# Patient Record
Sex: FEMALE | Race: ASIAN | HISPANIC OR LATINO | Employment: UNEMPLOYED | URBAN - METROPOLITAN AREA
[De-identification: names, ages, dates, MRNs, and addresses within clinical notes are randomized per-mention and may not be internally consistent; named-entity substitution may affect disease eponyms.]

---

## 2023-10-17 ENCOUNTER — OFFICE VISIT (OUTPATIENT)
Dept: INTERNAL MEDICINE | Facility: CLINIC | Age: 28
End: 2023-10-17

## 2023-10-17 VITALS
DIASTOLIC BLOOD PRESSURE: 66 MMHG | HEIGHT: 60 IN | WEIGHT: 151.19 LBS | RESPIRATION RATE: 18 BRPM | SYSTOLIC BLOOD PRESSURE: 108 MMHG | TEMPERATURE: 99 F | HEART RATE: 72 BPM | BODY MASS INDEX: 29.68 KG/M2

## 2023-10-17 DIAGNOSIS — N63.42 SUBAREOLAR MASS OF LEFT BREAST: Primary | ICD-10-CM

## 2023-10-17 DIAGNOSIS — Z00.00 WELLNESS EXAMINATION: ICD-10-CM

## 2023-10-17 DIAGNOSIS — Z12.4 CERVICAL CANCER SCREENING: ICD-10-CM

## 2023-10-17 LAB
APPEARANCE UR: CLEAR
BACTERIA #/AREA URNS AUTO: ABNORMAL /HPF
BILIRUB UR QL STRIP.AUTO: NEGATIVE
COLOR UR AUTO: ABNORMAL
GLUCOSE UR QL STRIP.AUTO: NORMAL
HYALINE CASTS #/AREA URNS LPF: ABNORMAL /LPF
KETONES UR QL STRIP.AUTO: NEGATIVE
LEUKOCYTE ESTERASE UR QL STRIP.AUTO: NEGATIVE
MUCOUS THREADS URNS QL MICRO: ABNORMAL /LPF
NITRITE UR QL STRIP.AUTO: NEGATIVE
PH UR STRIP.AUTO: 5 [PH]
PROT UR QL STRIP.AUTO: NEGATIVE
RBC #/AREA URNS AUTO: ABNORMAL /HPF
RBC UR QL AUTO: ABNORMAL
SP GR UR STRIP.AUTO: 1.02 (ref 1–1.03)
SQUAMOUS #/AREA URNS LPF: ABNORMAL /HPF
UROBILINOGEN UR STRIP-ACNC: NORMAL
WBC #/AREA URNS AUTO: ABNORMAL /HPF

## 2023-10-17 PROCEDURE — 99203 OFFICE O/P NEW LOW 30 MIN: CPT | Mod: S$PBB,,, | Performed by: NURSE PRACTITIONER

## 2023-10-17 PROCEDURE — 99203 PR OFFICE/OUTPT VISIT, NEW, LEVL III, 30-44 MIN: ICD-10-PCS | Mod: S$PBB,,, | Performed by: NURSE PRACTITIONER

## 2023-10-17 PROCEDURE — 81001 URINALYSIS AUTO W/SCOPE: CPT | Performed by: NURSE PRACTITIONER

## 2023-10-17 PROCEDURE — 99205 OFFICE O/P NEW HI 60 MIN: CPT | Mod: PBBFAC | Performed by: NURSE PRACTITIONER

## 2023-10-17 NOTE — PROGRESS NOTES
Patient ID: [id number]     Chief Complaint: Establish Care    HPI:     : Abram [id number]    Leola Joshua is a Welsh Speaking 28 y.o. female with diagnosis of no significant medical history. Patient seen in clinic today to establish care.  Patient states she does not have a PCP. Patient states she has a lump to her left breast x3 years, appears to have increased in size recently. Patient states history of breast cyst x2 with removal. Patient states tenderness to area, denies nipple discharge. Patient denies family history of breast cancer. Patient denies any other acute complaints.     Review of patient's allergies indicates:  No Known Allergies    Breast Cancer Screening: deferred due to age  Cervical Cancer Screening: referred to GYN  Colorectal Cancer Screening: deferred due to age  Diabetic Eye Exam: N/A  Diabetic Foot Exam: N/A  Lung Cancer Screening: N/A  Prostate Cancer Screening: N/A  AAA Screening: N/A  Osteoporosis Screening: deferred due to age  Medicare Wellness: N/A  Immunizations:   There is no immunization history on file for this patient.    Past Surgical History:   Procedure Laterality Date    BREAST BIOPSY Right     BREAST MASS EXCISION Left      family history includes Diabetes in her paternal grandfather; No Known Problems in her father and mother.    Social History     Socioeconomic History    Marital status: Single    Number of children: 2   Tobacco Use    Smoking status: Never    Smokeless tobacco: Never   Substance and Sexual Activity    Alcohol use: Yes     Alcohol/week: 2.0 standard drinks of alcohol     Types: 2 Glasses of wine per week     Comment: occasionally    Drug use: Never    Sexual activity: Yes     Partners: Male     No current outpatient medications    Subjective:     Review of Systems   Constitutional: Negative.    HENT: Negative.     Eyes: Negative.    Respiratory: Negative.     Cardiovascular: Negative.    Gastrointestinal: Negative.    Endocrine:  "Negative.    Genitourinary: Negative.    Musculoskeletal: Negative.    Skin: Negative.    Allergic/Immunologic: Negative.    Neurological: Negative.    Hematological: Negative.    Psychiatric/Behavioral: Negative.         Objective:     Visit Vitals  /66 (BP Location: Right arm, Patient Position: Sitting, BP Method: Medium (Automatic))   Pulse 72   Temp 98.6 °F (37 °C) (Oral)   Resp 18   Ht 5' (1.524 m)   Wt 68.6 kg (151 lb 3.2 oz)   LMP 09/20/2023 (Approximate)   BMI 29.53 kg/m²       Physical Exam  Vitals reviewed.   Constitutional:       Appearance: Normal appearance.   HENT:      Head: Normocephalic and atraumatic.      Mouth/Throat:      Mouth: Mucous membranes are moist.      Pharynx: Oropharynx is clear.   Eyes:      Extraocular Movements: Extraocular movements intact.      Conjunctiva/sclera: Conjunctivae normal.      Pupils: Pupils are equal, round, and reactive to light.   Cardiovascular:      Rate and Rhythm: Normal rate and regular rhythm.      Heart sounds: Normal heart sounds.   Pulmonary:      Effort: Pulmonary effort is normal.      Breath sounds: Normal breath sounds.   Chest:   Breasts:     Breasts are symmetrical.      Right: No swelling, bleeding, inverted nipple, nipple discharge or skin change.      Left: Mass and tenderness present. No swelling, bleeding, inverted nipple, nipple discharge or skin change.       Abdominal:      General: Bowel sounds are normal.   Musculoskeletal:         General: Normal range of motion.      Cervical back: Normal range of motion.   Skin:     General: Skin is warm and dry.   Neurological:      Mental Status: She is alert and oriented to person, place, and time.   Psychiatric:         Mood and Affect: Mood normal.         Behavior: Behavior normal.       Labs Reviewed:     Hematology:  No results found for: "WBC", "HGB", "HCT", "PLT"    Chemistry:  No results found for: "NA", "K", "CHLORIDE", "BUN", "CREATININE", "EGFRNORACEVR", "GLUCOSE", "CALCIUM", " ""ALKPHOS", "LABPROT", "ALBUMIN", "BILIDIR", "IBILI", "AST", "ALT", "MG", "PHOS", "[id number]"     No results found for: "HGBA1C", "MICROALBCREA"     Lipid Panel:  No results found for: "CHOL", "HDL", "LDL", "TRIG", "TOTALCHOLEST"     Thyroid:  No results found for: "TSH", "T4FREE", "T3FREE"     Urine:  No results found for: "COLORUA", "APPEARANCEUA", "SGUA", "PHUA", "PROTEINUA", "GLUCOSEUA", "KETONESUA", "BLOODUA", "NITRITESUA", "LEUKOCYTESUR", "RBCUA", "WBCUA", "BACTERIA", "SQEPUA", "HYALINECASTS", "CREATRANDUR", "PROTEINURINE", "UPROTCREA"     Assessment:       ICD-10-CM ICD-9-CM   1. Subareolar mass of left breast  N63.42 611.72   2. Cervical cancer screening  Z12.4 V76.2   3. Wellness examination  Z00.00 V70.0        Plan:     1. Subareolar mass of left breast  - Mammo Digital Diagnostic Left; Future  - US Breast Left Limited; Future    2. Cervical cancer screening  - Ambulatory referral/consult to Gynecology; Future    3. Wellness examination  - CBC Auto Differential; Future  - Comprehensive Metabolic Panel; Future  - Hemoglobin A1C; Future  - Lipid Panel; Future  - Urinalysis; Future  - TSH; Future  - Hepatitis C antibody; Future  - HIV 1/2 Ag/Ab (4th Gen); Future  - Urinalysis      Follow up in about 2 weeks (around 10/31/2023) for Labs. In addition to their scheduled follow up, the patient has also been instructed to follow up on as needed basis.     LLOYD Alamo    "

## 2023-11-08 ENCOUNTER — OFFICE VISIT (OUTPATIENT)
Dept: INTERNAL MEDICINE | Facility: CLINIC | Age: 28
End: 2023-11-08

## 2023-11-08 VITALS
DIASTOLIC BLOOD PRESSURE: 66 MMHG | TEMPERATURE: 99 F | SYSTOLIC BLOOD PRESSURE: 97 MMHG | HEIGHT: 60 IN | BODY MASS INDEX: 29.96 KG/M2 | WEIGHT: 152.63 LBS | RESPIRATION RATE: 18 BRPM | HEART RATE: 66 BPM

## 2023-11-08 DIAGNOSIS — Z59.89 DOES NOT HAVE HEALTH INSURANCE: ICD-10-CM

## 2023-11-08 DIAGNOSIS — N63.42 SUBAREOLAR MASS OF LEFT BREAST: ICD-10-CM

## 2023-11-08 DIAGNOSIS — Z00.00 WELLNESS EXAMINATION: Primary | ICD-10-CM

## 2023-11-08 PROCEDURE — 99395 PR PREVENTIVE VISIT,EST,18-39: ICD-10-PCS | Mod: S$PBB,,, | Performed by: NURSE PRACTITIONER

## 2023-11-08 PROCEDURE — 99214 OFFICE O/P EST MOD 30 MIN: CPT | Mod: PBBFAC | Performed by: NURSE PRACTITIONER

## 2023-11-08 PROCEDURE — 99395 PREV VISIT EST AGE 18-39: CPT | Mod: S$PBB,,, | Performed by: NURSE PRACTITIONER

## 2023-11-08 RX ORDER — IBUPROFEN 800 MG/1
800 TABLET ORAL 3 TIMES DAILY PRN
Qty: 30 TABLET | Refills: 2 | Status: SHIPPED | OUTPATIENT
Start: 2023-11-08

## 2023-11-08 SDOH — SOCIAL DETERMINANTS OF HEALTH (SDOH): OTHER PROBLEMS RELATED TO HOUSING AND ECONOMIC CIRCUMSTANCES: Z59.89

## 2023-11-08 NOTE — PROGRESS NOTES
Patient ID: [id number]     Chief Complaint: Annual Exam    HPI:     : Abram [id number]    Leola Joshua is a Andorran Speaking 28 y.o. female with diagnosis of no significant medical history. Patient seen in clinic today for follow up, wellness. Patient last seen in clinic on 10/17/2023.   Patient denies any acute complaints.   MMG scheduled due to left breast lump x3 years, appears to have increased in size recently. Patient states history of breast cyst x2 with removal. Patient states tenderness to area, denies nipple discharge. Patient denies family history of breast cancer.     Review of patient's allergies indicates:  No Known Allergies    Breast Cancer Screening: deferred due to age  Cervical Cancer Screening: referred to GYN  Colorectal Cancer Screening: deferred due to age  Diabetic Eye Exam: N/A  Diabetic Foot Exam: N/A  Lung Cancer Screening: N/A  Prostate Cancer Screening: N/A  AAA Screening: N/A  Osteoporosis Screening: deferred due to age  Medicare Wellness: N/A  Immunizations:   There is no immunization history on file for this patient.    Past Surgical History:   Procedure Laterality Date    BREAST BIOPSY Right     BREAST MASS EXCISION Left      family history includes Diabetes in her paternal grandfather; No Known Problems in her father and mother.    Social History     Socioeconomic History    Marital status: Single    Number of children: 2   Tobacco Use    Smoking status: Never    Smokeless tobacco: Never   Substance and Sexual Activity    Alcohol use: Yes     Alcohol/week: 2.0 standard drinks of alcohol     Types: 2 Glasses of wine per week     Comment: occasionally    Drug use: Never    Sexual activity: Yes     Partners: Male     Social Determinants of Health     Financial Resource Strain: Medium Risk (11/8/2023)    Overall Financial Resource Strain (CARDIA)     Difficulty of Paying Living Expenses: Somewhat hard   Food Insecurity: Food Insecurity Present (11/8/2023)    Hunger  Vital Sign     Worried About Running Out of Food in the Last Year: Sometimes true     Ran Out of Food in the Last Year: Sometimes true   Transportation Needs: No Transportation Needs (11/8/2023)    PRAPARE - Transportation     Lack of Transportation (Medical): No     Lack of Transportation (Non-Medical): No   Physical Activity: Inactive (11/8/2023)    Exercise Vital Sign     Days of Exercise per Week: 0 days     Minutes of Exercise per Session: 0 min   Stress: Stress Concern Present (11/8/2023)    [employer] [place] [employer] - Occupational Stress Questionnaire     Feeling of Stress : Very much   Social Connections: Moderately Isolated (11/8/2023)    Social Connection and Isolation Panel [NHANES]     Frequency of Communication with Friends and Family: More than three times a week     Frequency of Social Gatherings with Friends and Family: Once a week     Attends Hinduism Services: 1 to 4 times per year     Active Member of Clubs or Organizations: No     Attends Club or Organization Meetings: Never     Marital Status: Never    Housing Stability: Low Risk  (11/8/2023)    Housing Stability Vital Sign     Unable to Pay for Housing in the Last Year: No     Number of Places Lived in the Last Year: 2     Unstable Housing in the Last Year: No     Current Outpatient Medications   Medication Instructions    ibuprofen (ADVIL,MOTRIN) 800 mg, Oral, 3 times daily PRN       Subjective:     Review of Systems   Constitutional: Negative.    HENT: Negative.     Eyes: Negative.    Respiratory: Negative.     Cardiovascular: Negative.    Gastrointestinal: Negative.    Endocrine: Negative.    Genitourinary: Negative.    Musculoskeletal: Negative.    Skin: Negative.    Allergic/Immunologic: Negative.    Neurological: Negative.    Hematological: Negative.    Psychiatric/Behavioral: Negative.         Objective:     Visit Vitals  BP 97/66 (BP Location: Right arm, Patient Position: Sitting, BP Method: Medium (Automatic))    Pulse 66   Temp 98.7 °F (37.1 °C) (Oral)   Resp 18   Ht 5' (1.524 m)   Wt 69.2 kg (152 lb 9.6 oz)   LMP 10/22/2023 (Approximate)   BMI 29.80 kg/m²       Physical Exam  Vitals reviewed.   Constitutional:       Appearance: Normal appearance.   HENT:      Head: Normocephalic and atraumatic.      Mouth/Throat:      Mouth: Mucous membranes are moist.      Pharynx: Oropharynx is clear.   Eyes:      Extraocular Movements: Extraocular movements intact.      Conjunctiva/sclera: Conjunctivae normal.      Pupils: Pupils are equal, round, and reactive to light.   Cardiovascular:      Rate and Rhythm: Normal rate and regular rhythm.      Heart sounds: Normal heart sounds.   Pulmonary:      Effort: Pulmonary effort is normal.      Breath sounds: Normal breath sounds.   Chest:   Breasts:     Breasts are symmetrical.      Right: No swelling, bleeding, inverted nipple, nipple discharge or skin change.      Left: Mass and tenderness present. No swelling, bleeding, inverted nipple, nipple discharge or skin change.       Abdominal:      General: Bowel sounds are normal.   Musculoskeletal:         General: Normal range of motion.      Cervical back: Normal range of motion.   Skin:     General: Skin is warm and dry.   Neurological:      Mental Status: She is alert and oriented to person, place, and time.   Psychiatric:         Mood and Affect: Mood normal.         Behavior: Behavior normal.       Labs Reviewed:     Hematology:  Lab Results   Component Value Date    WBC 6.47 10/17/2023    HGB 13.6 10/17/2023    HCT 41.3 10/17/2023     10/17/2023     Chemistry:  Lab Results   Component Value Date     10/17/2023    K 3.8 10/17/2023    CHLORIDE 108 (H) 10/17/2023    BUN 13.5 10/17/2023    CREATININE 0.61 10/17/2023    EGFRNORACEVR >60 10/17/2023    GLUCOSE 86 10/17/2023    CALCIUM 9.4 10/17/2023    ALKPHOS 114 10/17/2023    LABPROT 7.9 10/17/2023    ALBUMIN 4.2 10/17/2023    AST 20 10/17/2023    ALT 23 10/17/2023     Lab  Results   Component Value Date    HGBA1C 5.0 10/17/2023     Lipid Panel:  Lab Results   Component Value Date    CHOL 166 10/17/2023    HDL 36 10/17/2023    .00 10/17/2023    TRIG 73 10/17/2023    TOTALCHOLEST 5 10/17/2023     Thyroid:  Lab Results   Component Value Date    TSH 2.629 10/17/2023     Urine:  Lab Results   Component Value Date    COLORUA Light-Yellow 10/17/2023    APPEARANCEUA Clear 10/17/2023    SGUA 1.021 10/17/2023    PHUA 5.0 10/17/2023    PROTEINUA Negative 10/17/2023    GLUCOSEUA Normal 10/17/2023    KETONESUA Negative 10/17/2023    BLOODUA 1+ (A) 10/17/2023    NITRITESUA Negative 10/17/2023    LEUKOCYTESUR Negative 10/17/2023    RBCUA 0-5 10/17/2023    WBCUA 0-5 10/17/2023    BACTERIA Trace (A) 10/17/2023    SQEPUA Moderate (A) 10/17/2023    HYALINECASTS None Seen 10/17/2023        Assessment:       ICD-10-CM ICD-9-CM   1. Wellness examination  Z00.00 V70.0   2. Subareolar mass of left breast  N63.42 611.72   3. Does not have health insurance  Z59.89 V60.89       Plan:     1. Wellness examination  Labs reviewed  GYN: referral ordered  Dentist: recommend Q6 months  Optometrist: recommend yearly    2. Subareolar mass of left breast  MMG scheduled for 11/15/2023  Ibuprofen 800 mg tid prn pain    3. Does not have health insurance      Follow up in about 6 months (around 5/8/2024) for Labs. In addition to their scheduled follow up, the patient has also been instructed to follow up on as needed basis.     LLOYD Alamo

## 2023-11-15 ENCOUNTER — HOSPITAL ENCOUNTER (OUTPATIENT)
Dept: RADIOLOGY | Facility: HOSPITAL | Age: 28
Discharge: HOME | End: 2023-11-15
Attending: NURSE PRACTITIONER

## 2023-11-15 DIAGNOSIS — N63.42 SUBAREOLAR MASS OF LEFT BREAST: ICD-10-CM

## 2023-11-15 PROCEDURE — 76642 ULTRASOUND BREAST LIMITED: CPT | Mod: 26,LT,, | Performed by: RADIOLOGY

## 2023-11-15 PROCEDURE — 76642 ULTRASOUND BREAST LIMITED: CPT | Mod: TC,LT

## 2023-11-15 PROCEDURE — 76642 US BREAST LEFT LIMITED: ICD-10-PCS | Mod: 26,LT,, | Performed by: RADIOLOGY

## 2023-11-16 ENCOUNTER — PATIENT MESSAGE (OUTPATIENT)
Dept: ADMINISTRATIVE | Facility: HOSPITAL | Age: 28
End: 2023-11-16

## 2023-11-20 ENCOUNTER — PATIENT OUTREACH (OUTPATIENT)
Dept: ADMINISTRATIVE | Facility: HOSPITAL | Age: 28
End: 2023-11-20

## 2023-11-20 NOTE — PROGRESS NOTES
[employer] response received from pt. Pt due/requesting order/referral for cervical cancer screening. Gyn referral to [facility] gyn clinic placed per pcp.  Pt notified via portal message.     .pop

## 2024-01-22 ENCOUNTER — HOSPITAL ENCOUNTER (OUTPATIENT)
Dept: RADIOLOGY | Facility: HOSPITAL | Age: 29
Discharge: HOME | End: 2024-01-22
Attending: NURSE PRACTITIONER

## 2024-01-22 DIAGNOSIS — N63.25 MASS OVERLAPPING MULTIPLE QUADRANTS OF LEFT BREAST: Primary | ICD-10-CM

## 2024-01-22 DIAGNOSIS — R93.89 ABNORMAL ULTRASOUND: ICD-10-CM

## 2024-01-22 DIAGNOSIS — N63.42 SUBAREOLAR MASS OF LEFT BREAST: ICD-10-CM

## 2024-01-22 PROCEDURE — 88305 TISSUE EXAM BY PATHOLOGIST: CPT | Mod: TC | Performed by: RADIOLOGY

## 2024-01-22 PROCEDURE — A4648 IMPLANTABLE TISSUE MARKER: HCPCS

## 2024-01-22 PROCEDURE — 19083 BX BREAST 1ST LESION US IMAG: CPT | Mod: LT,,, | Performed by: RADIOLOGY

## 2024-01-22 RX ORDER — LIDOCAINE HYDROCHLORIDE 20 MG/ML
INJECTION, SOLUTION EPIDURAL; INFILTRATION; INTRACAUDAL; PERINEURAL
Status: DISCONTINUED
Start: 2024-01-22 | End: 2024-01-23 | Stop reason: HOSPADM

## 2024-01-22 RX ORDER — LIDOCAINE HCL/EPINEPHRINE/PF 2%-1:200K
VIAL (ML) INJECTION
Status: DISCONTINUED
Start: 2024-01-22 | End: 2024-01-23 | Stop reason: HOSPADM

## 2024-01-24 LAB
ESTROGEN SERPL-MCNC: NORMAL PG/ML
INSULIN SERPL-ACNC: NORMAL U[IU]/ML
LAB AP CLINICAL INFORMATION: NORMAL
LAB AP GROSS DESCRIPTION: NORMAL
LAB AP REPORT FOOTNOTES: NORMAL
T3RU NFR SERPL: NORMAL %

## 2024-01-25 ENCOUNTER — TELEPHONE (OUTPATIENT)
Dept: RADIOLOGY | Facility: HOSPITAL | Age: 29
End: 2024-01-25

## 2024-01-25 NOTE — TELEPHONE ENCOUNTER
----- Message from Edison Gonzalez MD sent at 1/24/2024  5:09 PM CST -----  Regarding: Benign Pathology  Please see below.     Makenna, please communicate the results of the biopsy and recommendations to the patient.    Thanks.              Pathology is now available for review, and demonstrates:  Ultrasound-guided core biopsy of the left lower central breast 3.8 x 1.8 x 3.8 cm mass at the 6 o'clock location, 3 cm from the nipple.  Hydromark butterfly-shaped biopsy clip is in appropriate position.  - Fibroadenoma.     Please see pathology report for full details. These pathology results are concordant with imaging findings.     Recommend referral to the [facility] breast clinic for consultation/management should the patient desire excision of this left breast mass.  Repeat limited left breast ultrasound can be performed should this palpable mass significantly increase and excision be deferred.  Otherwise, recommend annual screening mammography beginning at age 40 years in average-risk women, according to American College of Radiology guidelines. If this patient has a first-degree relative (mother, sister, brother, or daughter/son) with a history of breast cancer, this patient should begin annual screening mammography 10 years earlier than when the first-degree relative was diagnosed with breast cancer (but no earlier than age 30 years and no later than age 40 years).    Pathology results and recommendations will be communicated by Makenna Velasquez, [facility] Breast Imaging nurse navigator, to the patient/provider and documented in the electronic medical record.

## 2024-01-25 NOTE — NURSING
Called patient with  ([phone]) to discuss breast biopsy pathology results and recommendations. Patient hung up on  on first attempt.  attempted to reach patient a second time. No answer. [facility] with call back number.

## 2024-01-26 NOTE — NURSING
Called patient with  to discuss breast biopsy pathology results and recommendations. No answer.  [facility] with call back number.

## 2024-02-01 NOTE — NURSING
Called patient with  ([phone]) to discuss breast biopsy pathology results and recommendations. No answer.  [facility] with call back number.

## 2024-02-06 NOTE — NURSING
Three attempts to reach patient via telephone with  to discuss breast biopsy pathology results and recommendations. No answer.  left voicemails. No call back. Certified letter mailed.

## 2024-02-09 ENCOUNTER — OFFICE VISIT (OUTPATIENT)
Dept: INTERNAL MEDICINE | Facility: CLINIC | Age: 29
End: 2024-02-09

## 2024-02-09 DIAGNOSIS — D24.2 FIBROADENOMA OF LEFT BREAST: Primary | ICD-10-CM

## 2024-02-09 PROCEDURE — 99212 OFFICE O/P EST SF 10 MIN: CPT | Mod: 95,,, | Performed by: NURSE PRACTITIONER

## 2024-02-09 NOTE — PROGRESS NOTES
Patient ID: [id number]     Chief Complaint: Results    HPI:     Audio Only Telehealth Visit     The patient location is: home  The chief complaint leading to consultation is: follow up  Visit type: Virtual visit with audio only (telephone)  Total time spent with patient: 10 minutes     The reason for the audio only service rather than synchronous audio and video virtual visit was related to technical difficulties or patient preference/necessity.     Each patient to whom I provide medical services by telemedicine is:  (1) informed of the relationship between the physician and patient and the respective role of any other health care provider with respect to management of the patient; and (2) notified that they may decline to receive medical services by telemedicine and may withdraw from such care at any time. Patient verbally consented to receive this service via voice-only telephone call.    This service was not originating from a related E/M service provided within the previous 7 days nor will  to an E/M service or procedure within the next 24 hours or my soonest available appointment.  Prevailing standard of care was able to be met in this audio-only visit.       : Clive [phone]    Leola Joshua is a Irish Speaking 28 y.o. female with diagnosis of no significant medical history. Patient seen today for follow up. Patient last seen on 11/08/2023.   MMG with US and Biopsy completed due to left breast lump x3 years, appears to have increased in size recently. Biopsy indicated fibroadenoma. Radiologist recommended referral to breast clinic if patient desires lump be excised. Patient states she does prefer to have lump removed due to it being painful at times. Patient denies family history of breast cancer.     Review of patient's allergies indicates:  No Known Allergies    Breast Cancer Screening: deferred due to age  Cervical Cancer Screening: referred to GYN  Colorectal Cancer Screening:  deferred due to age  Diabetic Eye Exam: N/A  Diabetic Foot Exam: N/A  Lung Cancer Screening: N/A  Prostate Cancer Screening: N/A  AAA Screening: N/A  Osteoporosis Screening: deferred due to age  Medicare Wellness: N/A  Immunizations:   There is no immunization history on file for this patient.    Past Surgical History:   Procedure Laterality Date    BREAST BIOPSY Right     BREAST MASS EXCISION Left      family history includes Diabetes in her paternal grandfather; No Known Problems in her father and mother.    Social History     Socioeconomic History    Marital status: Single    Number of children: 2   Tobacco Use    Smoking status: Never    Smokeless tobacco: Never   Substance and Sexual Activity    Alcohol use: Yes     Alcohol/week: 2.0 standard drinks of alcohol     Types: 2 Glasses of wine per week     Comment: occasionally    Drug use: Never    Sexual activity: Yes     Partners: Male     Social Determinants of Health     Financial Resource Strain: Low Risk  (2/9/2024)    Overall Financial Resource Strain (CARDIA)     Difficulty of Paying Living Expenses: Not very hard   Food Insecurity: Food Insecurity Present (2/9/2024)    Hunger Vital Sign     Worried About Running Out of Food in the Last Year: Sometimes true     Ran Out of Food in the Last Year: Never true   Transportation Needs: No Transportation Needs (2/9/2024)    PRAPARE - Transportation     Lack of Transportation (Medical): No     Lack of Transportation (Non-Medical): No   Physical Activity: Inactive (2/9/2024)    Exercise Vital Sign     Days of Exercise per Week: 1 day     Minutes of Exercise per Session: 0 min   Stress: Stress Concern Present (2/9/2024)    [place] [employer] - Occupational Stress Questionnaire     Feeling of Stress : Rather much   Social Connections: Moderately Isolated (2/9/2024)    Social Connection and Isolation Panel [NHANES]     Frequency of Communication with Friends and Family: More than three times a week      Frequency of Social Gatherings with Friends and Family: Once a week     Attends Baptism Services: 1 to 4 times per year     Active Member of Clubs or Organizations: No     Attends Club or Organization Meetings: Never     Marital Status:    Housing Stability: Low Risk  (2/9/2024)    Housing Stability Vital Sign     Unable to Pay for Housing in the Last Year: No     Number of Places Lived in the Last Year: 2     Unstable Housing in the Last Year: No     Current Outpatient Medications   Medication Instructions    ibuprofen (ADVIL,MOTRIN) 800 mg, Oral, 3 times daily PRN       Subjective:     Review of Systems   Constitutional: Negative.    HENT: Negative.     Eyes: Negative.    Respiratory: Negative.     Cardiovascular: Negative.    Gastrointestinal: Negative.    Endocrine: Negative.    Genitourinary: Negative.    Musculoskeletal: Negative.    Skin: Negative.    Allergic/Immunologic: Negative.    Neurological: Negative.    Hematological: Negative.    Psychiatric/Behavioral: Negative.         Objective:     There were no vitals taken for this visit.      Physical Exam  Neurological:      Mental Status: She is alert and oriented to person, place, and time.   Psychiatric:         Mood and Affect: Mood normal.       Labs Reviewed:     Hematology:  Lab Results   Component Value Date    WBC 6.47 10/17/2023    HGB 13.6 10/17/2023    HCT 41.3 10/17/2023     10/17/2023     Chemistry:  Lab Results   Component Value Date     10/17/2023    K 3.8 10/17/2023    CHLORIDE 108 (H) 10/17/2023    BUN 13.5 10/17/2023    CREATININE 0.61 10/17/2023    EGFRNORACEVR >60 10/17/2023    GLUCOSE 86 10/17/2023    CALCIUM 9.4 10/17/2023    ALKPHOS 114 10/17/2023    LABPROT 7.9 10/17/2023    ALBUMIN 4.2 10/17/2023    AST 20 10/17/2023    ALT 23 10/17/2023     Lab Results   Component Value Date    HGBA1C 5.0 10/17/2023     Lipid Panel:  Lab Results   Component Value Date    CHOL 166 10/17/2023    HDL 36 10/17/2023    LDL  115.00 10/17/2023    TRIG 73 10/17/2023    TOTALCHOLEST 5 10/17/2023     Thyroid:  Lab Results   Component Value Date    TSH 2.629 10/17/2023     Urine:  Lab Results   Component Value Date    COLORUA Light-Yellow 10/17/2023    APPEARANCEUA Clear 10/17/2023    SGUA 1.021 10/17/2023    PHUA 5.0 10/17/2023    PROTEINUA Negative 10/17/2023    GLUCOSEUA Normal 10/17/2023    KETONESUA Negative 10/17/2023    BLOODUA 1+ (A) 10/17/2023    NITRITESUA Negative 10/17/2023    LEUKOCYTESUR Negative 10/17/2023    RBCUA 0-5 10/17/2023    WBCUA 0-5 10/17/2023    BACTERIA Trace (A) 10/17/2023    SQEPUA Moderate (A) 10/17/2023    HYALINECASTS None Seen 10/17/2023        Assessment:       ICD-10-CM ICD-9-CM   1. Fibroadenoma of left breast  D24.2 217       Plan:     1. Fibroadenoma of left breast  - Ambulatory referral/consult to Breast Surgery; Future      Follow up if symptoms worsen or fail to improve. In addition to their scheduled follow up, the patient has also been instructed to follow up on as needed basis.     LLOYD Alamo

## 2024-03-14 ENCOUNTER — OFFICE VISIT (OUTPATIENT)
Dept: SURGERY | Facility: CLINIC | Age: 29
End: 2024-03-14

## 2024-03-14 VITALS
BODY MASS INDEX: 30 KG/M2 | DIASTOLIC BLOOD PRESSURE: 71 MMHG | SYSTOLIC BLOOD PRESSURE: 114 MMHG | WEIGHT: 152.81 LBS | HEIGHT: 60 IN | TEMPERATURE: 99 F | OXYGEN SATURATION: 98 % | RESPIRATION RATE: 18 BRPM | HEART RATE: 59 BPM

## 2024-03-14 DIAGNOSIS — D24.2 FIBROADENOMA OF LEFT BREAST: ICD-10-CM

## 2024-03-14 PROCEDURE — 99215 OFFICE O/P EST HI 40 MIN: CPT | Mod: PBBFAC

## 2024-03-14 RX ORDER — SODIUM CHLORIDE, SODIUM LACTATE, POTASSIUM CHLORIDE, CALCIUM CHLORIDE 600; 310; 30; 20 MG/100ML; MG/100ML; MG/100ML; MG/100ML
INJECTION, SOLUTION INTRAVENOUS CONTINUOUS
Status: CANCELLED | OUTPATIENT
Start: 2024-03-14

## 2024-03-14 NOTE — PROGRESS NOTES
Patient seen by Dr. Vita Manzo. Excisional Biopsy scheduled April 1, 2024.Consent signed.  RTC 2 weeks post-op.      English

## 2024-03-14 NOTE — H&P (VIEW-ONLY)
[facility] General Surgery Clinic Note    CC:   'Fibroadenoma of left breast'    HPI:   Leola Joshua is a 28 y.o. female with reported past surgical history of 2 benign breast excisions 10 years ago who presents today after left breast biopsy on 1/22/24 of a 4x4x2 cm mass showed a fibroadenoma. She presents today interested in excision of the mass. She first noticed this mass about 3 years ago at the exact site of one of her previous excisions. It has slowly grown since then, but recently it has grown more quickly and caused her more pain. She describes her pain as stabbing that does not radiate. She takes ibuprofen for the pain but does not take any other medications. She understands that the growth is not cancerous and does not need to be removed out of concern for malignancy. However, she would like to proceed with excision for peace of mind and to alleviate the pain.    PMH:   History reviewed. No pertinent past medical history.  Denies heart issues, kidney issues, lung issues, liver issues, diabetes.    PSH:   Past Surgical History:   Procedure Laterality Date    BREAST BIOPSY Left 1/22/24    BREAST MASS EXCISION x 2 Left, Right When patient was 17 and 18    C Sections x 2      Tooth extraction       SocHx:  Social History     Socioeconomic History    Marital status: Single    Number of children: 2   Tobacco Use    Smoking status: Never    Smokeless tobacco: Never   Substance and Sexual Activity    Alcohol use: Yes     Alcohol/week: 1.0 standard drinks of alcohol    Drug use: Never    Sexual activity: Yes     Partners: Male       Allergies:   No known allergies    Medications:  Current Outpatient Medications on File Prior to Visit   Medication Sig Dispense Refill    ibuprofen (ADVIL,MOTRIN) 800 MG tablet Take 1 tablet (800 mg total) by mouth 3 (three) times daily as needed for Pain. 30 tablet 2     Objective:  Physical Exam:  Gen: no acute distress, A&Ox4  Pulm: normal work of breathing on room  "air  Cardiac: RR  Abd: soft, ND  Breast: No skin retraction, drainage  Right breast- excision scar present. No palpable masses throughout breast and axilla  Left breast- excision scar present. Beneath excision scar, well-circumscribed, rubbery, mobile mass palpated consistent with size listed on biopsy.    Pathology: 1/22/24  "1. Breast, Left, Left lower central breast 3.8 x 1.8 x 3.8 cm mass at 6:00, 3 cmfn, needle biopsy:    - Fibroadenoma. "    Labs  No recent lab results.    A/P:   Leola Joshua is a 28 y.o. female with past surgical history of benign breast masses who presents today with a 4x4x2 cm Left breast fibroadenoma. The patient would like to have the mass excised.    -Discussed pathology results with patient  -Discussed risks, benefits, and alternatives to breast mass excision  -Patient consented for left breast mass excision  -Schedule for surgery Monday, April 1st.    Note written with assistance from  Fredy Romero, MS3      Addendum  Patient seen and examined.  Agree with above, edited as needed.    Vita Manzo MD  [facility], PGY3    "

## 2024-03-14 NOTE — PROGRESS NOTES
[facility] General Surgery Clinic Note    CC:   'Fibroadenoma of left breast'    HPI:   Leola Joshua is a 28 y.o. female with past surgical history of 2 benign breast excisions 10 years ago who presents today after left breast biopsy on 1/22/24 of a 4x4x2 cm mass showed a fibroadenoma. She presents today interested in excision of the mass. She first noticed this growth about 3 years ago at the exact site of one of her previous excisions. It has slowly grown since then, but recently it has grown more quickly and caused her more pain. She describes her pain as stabbing that does not radiate. She takes ibuprofen for the pain but does not take any other medications. She understands that the growth is not cancerous and does not need to be removed out of concern for malignancy. However, she would like to proceed with excision for peace of mind and to alleviate the pain.    PMH:   History reviewed. No pertinent past medical history.  Denies heart issues, kidney issues, lung issues, liver issues, diabetes.    PSH:   Past Surgical History:   Procedure Laterality Date    BREAST BIOPSY Left 1/22/24    BREAST MASS EXCISION x 2 Left, Right When patient was 17 and 18    C Sections x 2      Tooth extraction       SocHx:  Social History     Socioeconomic History    Marital status: Single    Number of children: 2   Tobacco Use    Smoking status: Never    Smokeless tobacco: Never   Substance and Sexual Activity    Alcohol use: Yes     Alcohol/week: 1.0 standard drinks of alcohol    Drug use: Never    Sexual activity: Yes     Partners: Male       Allergies:   No known allergies    Medications:  Current Outpatient Medications on File Prior to Visit   Medication Sig Dispense Refill    ibuprofen (ADVIL,MOTRIN) 800 MG tablet Take 1 tablet (800 mg total) by mouth 3 (three) times daily as needed for Pain. 30 tablet 2     Objective:  Physical Exam:  Breast: No skin retraction, drainage  Right breast- excision scar present. No palpable  "masses throughout breast and axilla  Left breast- excision scar present. Beneath excision scar, well-circumscribed, rubbery, mobile mass palpated consistent with size listed on biopsy.    Pathology: 1/22/24  "1. Breast, Left, Left lower central breast 3.8 x 1.8 x 3.8 cm mass at 6:00, 3 cmfn, needle biopsy:    - Fibroadenoma. "    Labs  No recent lab results.    A/P:   Leola Joshua is a 28 y.o. female with past surgical history of benign breast masses who presents today with a 4x4x2 cm Left breast fibroadenoma. Despite its benign pathology, the patient would like to have the mass excised. She has no other past medical history and otherwise healthy enough for an elective procedure.  -Discussed pathology results with patient  -Discussed risks, benefits, and alternatives to breast mass excision  -Patient consented for left breast mass excision  -Plan for surgery Monday, April 1st.    Fredy Romero, MS3  "

## 2024-03-14 NOTE — PROGRESS NOTES
[facility] General Surgery Clinic Note    CC:   'Fibroadenoma of left breast'    HPI:   Leola Joshua is a 28 y.o. female with reported past surgical history of 2 benign breast excisions 10 years ago who presents today after left breast biopsy on 1/22/24 of a 4x4x2 cm mass showed a fibroadenoma. She presents today interested in excision of the mass. She first noticed this mass about 3 years ago at the exact site of one of her previous excisions. It has slowly grown since then, but recently it has grown more quickly and caused her more pain. She describes her pain as stabbing that does not radiate. She takes ibuprofen for the pain but does not take any other medications. She understands that the growth is not cancerous and does not need to be removed out of concern for malignancy. However, she would like to proceed with excision for peace of mind and to alleviate the pain.    PMH:   History reviewed. No pertinent past medical history.  Denies heart issues, kidney issues, lung issues, liver issues, diabetes.    PSH:   Past Surgical History:   Procedure Laterality Date    BREAST BIOPSY Left 1/22/24    BREAST MASS EXCISION x 2 Left, Right When patient was 17 and 18    C Sections x 2      Tooth extraction       SocHx:  Social History     Socioeconomic History    Marital status: Single    Number of children: 2   Tobacco Use    Smoking status: Never    Smokeless tobacco: Never   Substance and Sexual Activity    Alcohol use: Yes     Alcohol/week: 1.0 standard drinks of alcohol    Drug use: Never    Sexual activity: Yes     Partners: Male       Allergies:   No known allergies    Medications:  Current Outpatient Medications on File Prior to Visit   Medication Sig Dispense Refill    ibuprofen (ADVIL,MOTRIN) 800 MG tablet Take 1 tablet (800 mg total) by mouth 3 (three) times daily as needed for Pain. 30 tablet 2     Objective:  Physical Exam:  Gen: no acute distress, A&Ox4  Pulm: normal work of breathing on room  "air  Cardiac: RR  Abd: soft, ND  Breast: No skin retraction, drainage  Right breast- excision scar present. No palpable masses throughout breast and axilla  Left breast- excision scar present. Beneath excision scar, well-circumscribed, rubbery, mobile mass palpated consistent with size listed on biopsy.    Pathology: 1/22/24  "1. Breast, Left, Left lower central breast 3.8 x 1.8 x 3.8 cm mass at 6:00, 3 cmfn, needle biopsy:    - Fibroadenoma. "    Labs  No recent lab results.    A/P:   Leola Joshua is a 28 y.o. female with past surgical history of benign breast masses who presents today with a 4x4x2 cm Left breast fibroadenoma. The patient would like to have the mass excised.    -Discussed pathology results with patient  -Discussed risks, benefits, and alternatives to breast mass excision  -Patient consented for left breast mass excision  -Schedule for surgery Monday, April 1st.    Note written with assistance from  Fredy Romero, MS3      Addendum  Patient seen and examined.  Agree with above, edited as needed.    Vita Manzo MD  [facility], PGY3    "

## 2024-03-15 NOTE — PROGRESS NOTES
I have reviewed the notes, assessments, and/or procedures performed by the resident, I concur with her/his documentation of Leola Joshua.     Chary Sutherland MD

## 2024-03-20 ENCOUNTER — ANESTHESIA EVENT (OUTPATIENT)
Dept: SURGERY | Facility: HOSPITAL | Age: 29
End: 2024-03-20

## 2024-03-20 NOTE — ANESTHESIA PREPROCEDURE EVALUATION
Leola Joshua is a 28 y.o. female PRESENTING FOR EXCISIONAL BIOPSY, BREAST (Left) with a history of  -L BREAST FIBROADENOMA; H/O BREAST CYSTS      BETA-BLOCKER: NONE    New Orders for Anesthesia: UPT      Past Surgical History:   Procedure Laterality Date    BREAST BIOPSY Right     BREAST MASS EXCISION Left      Pre-op Assessment    I have reviewed the NPO Status.      Review of Systems  Anesthesia Hx:  No problems with previous Anesthesia                Social:  Non-Smoker       Cardiovascular:  Cardiovascular Normal                                            Pulmonary:  Pulmonary Normal                       Renal/:  Renal/ Normal                 Hepatic/GI:  Hepatic/GI Normal                 Neurological:  Neurology Normal                                      Endocrine:  Endocrine Normal              Vitals:    04/01/24 0512 04/01/24 0520 04/01/24 0526 04/01/24 0617   BP:  111/70 111/70 114/70   Pulse:   69 68   Resp:    20   Temp:   36.8 °C (98.2 °F) 36 °C (96.8 °F)   TempSrc:   Oral Temporal   SpO2:   99% 100%   Weight: 69.4 kg (153 lb)      Height:             Physical Exam  General: Alert, Cooperative and Well nourished    Airway:  Mallampati: II   Mouth Opening: Normal  TM Distance: Normal  Tongue: Normal  Neck ROM: Normal ROM    Dental:  Intact    Chest/Lungs:  Clear to auscultation, Normal Respiratory Rate    Heart:  Rate: Normal  Rhythm: Regular Rhythm  Sounds: Normal       Latest Reference Range & Units 04/01/24 05:33   hCG Qualitative, Urine Negative  Negative     Lab Results   Component Value Date    WBC 6.47 10/17/2023    HGB 13.6 10/17/2023    HCT 41.3 10/17/2023    MCV 88.8 10/17/2023     10/17/2023       CMP  Sodium Level   Date Value Ref Range Status   10/17/2023 139 136 - 145 mmol/L Final     Potassium Level   Date Value Ref Range Status   10/17/2023 3.8 3.5 - 5.1 mmol/L Final     Carbon Dioxide   Date Value Ref Range Status   10/17/2023 25 22 - 29 mmol/L Final     Blood  Urea Nitrogen   Date Value Ref Range Status   10/17/2023 13.5 7.0 - 18.7 mg/dL Final     Creatinine   Date Value Ref Range Status   10/17/2023 0.61 0.55 - 1.02 mg/dL Final     Calcium Level Total   Date Value Ref Range Status   10/17/2023 9.4 8.4 - 10.2 mg/dL Final     Albumin Level   Date Value Ref Range Status   10/17/2023 4.2 3.5 - 5.0 g/dL Final     Bilirubin Total   Date Value Ref Range Status   10/17/2023 0.5 <=1.5 mg/dL Final     Alkaline Phosphatase   Date Value Ref Range Status   10/17/2023 114 40 - 150 unit/L Final     Aspartate Aminotransferase   Date Value Ref Range Status   10/17/2023 20 5 - 34 unit/L Final     Alanine Aminotransferase   Date Value Ref Range Status   10/17/2023 23 0 - 55 unit/L Final     eGFR   Date Value Ref Range Status   10/17/2023 >60 mls/min/1.73/m2 Final         Anesthesia Plan  Type of Anesthesia, risks & benefits discussed:    Anesthesia Type: Gen Supraglottic Airway  Intra-op Monitoring Plan: Standard ASA Monitors  Post Op Pain Control Plan: IV/PO Opioids PRN  Induction:  IV  Airway Plan: Direct  Informed Consent: Informed consent signed with the Patient and all parties understand the risks and agree with anesthesia plan.  All questions answered.   ASA Score: 1  Day of Surgery Review of History & Physical: H&P Update referred to the surgeon/provider.    Ready For Surgery From Anesthesia Perspective.     .

## 2024-03-25 ENCOUNTER — OFFICE VISIT (OUTPATIENT)
Dept: GYNECOLOGY | Facility: CLINIC | Age: 29
End: 2024-03-25

## 2024-03-25 VITALS
TEMPERATURE: 98 F | RESPIRATION RATE: 20 BRPM | SYSTOLIC BLOOD PRESSURE: 124 MMHG | OXYGEN SATURATION: 100 % | HEART RATE: 76 BPM | HEIGHT: 60 IN | BODY MASS INDEX: 30.43 KG/M2 | DIASTOLIC BLOOD PRESSURE: 71 MMHG | WEIGHT: 155 LBS

## 2024-03-25 DIAGNOSIS — Z30.40 SURVEILLANCE OF CONTRACEPTIVE IMPLANT: ICD-10-CM

## 2024-03-25 DIAGNOSIS — Z12.4 CERVICAL CANCER SCREENING: Primary | ICD-10-CM

## 2024-03-25 PROCEDURE — 87624 HPV HI-RISK TYP POOLED RSLT: CPT

## 2024-03-25 PROCEDURE — 88174 CYTOPATH C/V AUTO IN FLUID: CPT | Performed by: NURSE PRACTITIONER

## 2024-03-25 PROCEDURE — 99214 OFFICE O/P EST MOD 30 MIN: CPT | Mod: PBBFAC | Performed by: NURSE PRACTITIONER

## 2024-03-25 PROCEDURE — 99385 PREV VISIT NEW AGE 18-39: CPT | Mod: S$PBB,,, | Performed by: NURSE PRACTITIONER

## 2024-03-25 NOTE — PROGRESS NOTES
[facility] -  Gynecology / Women's Health Clinic      Subjective:       Patient ID: Leola Joshua is a 28 y.o. female.    Chief Complaint:  Gynecologic Exam    History of Present Illness  The patient  here for annual exam. Her LMP was 3/7/24. Period last 5-6 days and changes pads every 2 hours. Denies history of abnormal pap or having a pap smear. Denies urinary complaints. Denies pelvic pain, abnormal bleeding or discharge. Pt has hx of LT breast bx. She is scheduled for Lt breast excision with breast surgery on 24 for fibroadenoma. Pt reports no STIs in the past and no concerns. Contraception consist of nexplanon since 2019, states she was instructed 5 years in [facility]. Denies tobacco use. Dep. Screening 0. Denies fly hx of breast, ovarian, uterine or colon cancer.     GYN & OB History  Patient's last menstrual period was 2024 (approximate).       Review of patient's allergies indicates:  No Known Allergies  Past Medical History:   Diagnosis Date    Breast disorder      OB History    Para Term  AB Living   2 2           SAB IAB Ectopic Multiple Live Births                  # Outcome Date GA Lbr Avery/2nd Weight Sex Delivery Anes PTL Lv   2 Para            1 Para                 Review of Systems  Review of Systems    Negative except for pertinent findings for positives per HPI     Objective:    Physical Exam    /71 (BP Location: Right arm, Patient Position: Sitting, BP Method: Medium (Automatic))   Pulse 76   Temp 98.4 °F (36.9 °C) (Oral)   Resp 20   Ht 5' (1.524 m)   Wt 70.3 kg (155 lb)   LMP 2024 (Approximate)   SpO2 100%   BMI 30.27 kg/m²   GENERAL: Well-developed female. No acute distress.    SKIN: Normal to inspection, warm and intact.  BREASTS: No masses, lumps, discharge, tenderness Rt . Lt breast lump @ 6 o'clock  VULVA: General appearance normal; external genitalia with no lesions or erythema.  VAGINA: Mucosa/vaginal  vault pink, no abnormal discharge or lesions.  CERVIX: Pink, nulliparous appearing os, no erythema or abnormal discharge.  BIMANUAL EXAM: reveals a 10 week-sized uterus. The uterus is non tender. Marlon adnexa reveal no tenderness.  PSYCHIATRIC: Patient is oriented to person, place, and time. Mood and affect are normal.    Assessment:         ICD-10-CM ICD-9-CM   1. Cervical cancer screening  Z12.4 V76.2   2. Surveillance of contraceptive implant  Z30.40 V25.43     Plan:   Leola was seen today for gynecologic exam.    Diagnoses and all orders for this visit:    Cervical cancer screening  -     Ambulatory referral/consult to Gynecology    Surveillance of contraceptive implant    Pap today  Nexplanon palpated to Lt UE, pt is self pay, will seek care with health unit for Nexplanon removal   Follow up in about 1 year (around 3/25/2025) for annual exam.

## 2024-03-28 ENCOUNTER — PATIENT MESSAGE (OUTPATIENT)
Dept: SURGERY | Facility: HOSPITAL | Age: 29
End: 2024-03-28

## 2024-03-28 LAB — PSYCHE PATHOLOGY RESULT: NORMAL

## 2024-04-01 ENCOUNTER — ANESTHESIA (OUTPATIENT)
Dept: SURGERY | Facility: HOSPITAL | Age: 29
End: 2024-04-01

## 2024-04-01 ENCOUNTER — HOSPITAL ENCOUNTER (OUTPATIENT)
Facility: HOSPITAL | Age: 29
Discharge: HOME | End: 2024-04-01
Attending: SURGERY | Admitting: SURGERY

## 2024-04-01 DIAGNOSIS — D24.2 FIBROADENOMA OF LEFT BREAST: Primary | ICD-10-CM

## 2024-04-01 LAB
B-HCG UR QL: NEGATIVE
CTP QC/QA: YES

## 2024-04-01 PROCEDURE — 71000015 HC POSTOP RECOV 1ST HR: Performed by: SURGERY

## 2024-04-01 PROCEDURE — 63600175 PHARM REV CODE 636 W HCPCS

## 2024-04-01 PROCEDURE — 25000003 PHARM REV CODE 250: Performed by: NURSE ANESTHETIST, CERTIFIED REGISTERED

## 2024-04-01 PROCEDURE — 88305 TISSUE EXAM BY PATHOLOGIST: CPT | Mod: TC | Performed by: SURGERY

## 2024-04-01 PROCEDURE — 36000706: Performed by: SURGERY

## 2024-04-01 PROCEDURE — 36000707: Performed by: SURGERY

## 2024-04-01 PROCEDURE — 25000003 PHARM REV CODE 250: Performed by: ANESTHESIOLOGY

## 2024-04-01 PROCEDURE — 63600175 PHARM REV CODE 636 W HCPCS: Performed by: NURSE ANESTHETIST, CERTIFIED REGISTERED

## 2024-04-01 PROCEDURE — 37000008 HC ANESTHESIA 1ST 15 MINUTES: Performed by: SURGERY

## 2024-04-01 PROCEDURE — 63600175 PHARM REV CODE 636 W HCPCS: Performed by: ANESTHESIOLOGY

## 2024-04-01 PROCEDURE — 81025 URINE PREGNANCY TEST: CPT | Performed by: NURSE PRACTITIONER

## 2024-04-01 PROCEDURE — 71000033 HC RECOVERY, INTIAL HOUR: Performed by: SURGERY

## 2024-04-01 PROCEDURE — 63600175 PHARM REV CODE 636 W HCPCS: Performed by: STUDENT IN AN ORGANIZED HEALTH CARE EDUCATION/TRAINING PROGRAM

## 2024-04-01 PROCEDURE — 71000016 HC POSTOP RECOV ADDL HR: Performed by: SURGERY

## 2024-04-01 PROCEDURE — 63600175 PHARM REV CODE 636 W HCPCS: Mod: JZ,JG | Performed by: SURGERY

## 2024-04-01 PROCEDURE — D9220A PRA ANESTHESIA: Mod: ,,, | Performed by: ANESTHESIOLOGY

## 2024-04-01 PROCEDURE — 37000009 HC ANESTHESIA EA ADD 15 MINS: Performed by: SURGERY

## 2024-04-01 RX ORDER — SODIUM CHLORIDE, SODIUM LACTATE, POTASSIUM CHLORIDE, CALCIUM CHLORIDE 600; 310; 30; 20 MG/100ML; MG/100ML; MG/100ML; MG/100ML
INJECTION, SOLUTION INTRAVENOUS CONTINUOUS
Status: DISCONTINUED | OUTPATIENT
Start: 2024-04-01 | End: 2024-04-01 | Stop reason: HOSPADM

## 2024-04-01 RX ORDER — MEPERIDINE HYDROCHLORIDE 25 MG/ML
12.5 INJECTION INTRAMUSCULAR; INTRAVENOUS; SUBCUTANEOUS EVERY 10 MIN PRN
Status: DISCONTINUED | OUTPATIENT
Start: 2024-04-01 | End: 2024-04-01 | Stop reason: HOSPADM

## 2024-04-01 RX ORDER — CEFAZOLIN SODIUM 1 G/3ML
2 INJECTION, POWDER, FOR SOLUTION INTRAMUSCULAR; INTRAVENOUS
Status: COMPLETED | OUTPATIENT
Start: 2024-04-01 | End: 2024-04-01

## 2024-04-01 RX ORDER — ONDANSETRON HYDROCHLORIDE 2 MG/ML
4 INJECTION, SOLUTION INTRAVENOUS DAILY PRN
Status: DISCONTINUED | OUTPATIENT
Start: 2024-04-01 | End: 2024-04-01 | Stop reason: HOSPADM

## 2024-04-01 RX ORDER — DEXAMETHASONE SODIUM PHOSPHATE 4 MG/ML
INJECTION, SOLUTION INTRA-ARTICULAR; INTRALESIONAL; INTRAMUSCULAR; INTRAVENOUS; SOFT TISSUE
Status: DISCONTINUED | OUTPATIENT
Start: 2024-04-01 | End: 2024-04-01

## 2024-04-01 RX ORDER — MORPHINE SULFATE 2 MG/ML
2 INJECTION, SOLUTION INTRAMUSCULAR; INTRAVENOUS EVERY 5 MIN PRN
Status: DISCONTINUED | OUTPATIENT
Start: 2024-04-01 | End: 2024-04-01 | Stop reason: HOSPADM

## 2024-04-01 RX ORDER — OXYCODONE HYDROCHLORIDE 5 MG/1
5 TABLET ORAL
Status: DISCONTINUED | OUTPATIENT
Start: 2024-04-01 | End: 2024-04-01 | Stop reason: HOSPADM

## 2024-04-01 RX ORDER — KETAMINE HCL IN 0.9 % NACL 50 MG/5 ML
SYRINGE (ML) INTRAVENOUS
Status: DISCONTINUED | OUTPATIENT
Start: 2024-04-01 | End: 2024-04-01

## 2024-04-01 RX ORDER — KETOROLAC TROMETHAMINE 30 MG/ML
INJECTION, SOLUTION INTRAMUSCULAR; INTRAVENOUS
Status: DISCONTINUED | OUTPATIENT
Start: 2024-04-01 | End: 2024-04-01

## 2024-04-01 RX ORDER — PROPOFOL 10 MG/ML
INJECTION, EMULSION INTRAVENOUS
Status: DISCONTINUED | OUTPATIENT
Start: 2024-04-01 | End: 2024-04-01

## 2024-04-01 RX ORDER — OXYCODONE HYDROCHLORIDE 5 MG/1
5 TABLET ORAL EVERY 4 HOURS PRN
Qty: 8 TABLET | Refills: 0 | Status: SHIPPED | OUTPATIENT
Start: 2024-04-01

## 2024-04-01 RX ORDER — ONDANSETRON HYDROCHLORIDE 2 MG/ML
4 INJECTION, SOLUTION INTRAVENOUS EVERY 12 HOURS PRN
Status: CANCELLED | OUTPATIENT
Start: 2024-04-01

## 2024-04-01 RX ORDER — FENTANYL CITRATE 50 UG/ML
INJECTION, SOLUTION INTRAMUSCULAR; INTRAVENOUS
Status: DISCONTINUED | OUTPATIENT
Start: 2024-04-01 | End: 2024-04-01

## 2024-04-01 RX ORDER — TRAMADOL HYDROCHLORIDE 50 MG/1
50 TABLET ORAL EVERY 4 HOURS PRN
Status: DISCONTINUED | OUTPATIENT
Start: 2024-04-01 | End: 2024-04-01 | Stop reason: HOSPADM

## 2024-04-01 RX ORDER — HEPARIN SODIUM 5000 [USP'U]/ML
5000 INJECTION, SOLUTION INTRAVENOUS; SUBCUTANEOUS ONCE
Status: COMPLETED | OUTPATIENT
Start: 2024-04-01 | End: 2024-04-01

## 2024-04-01 RX ORDER — BUPIVACAINE HYDROCHLORIDE 2.5 MG/ML
INJECTION, SOLUTION EPIDURAL; INFILTRATION; INTRACAUDAL
Status: DISCONTINUED | OUTPATIENT
Start: 2024-04-01 | End: 2024-04-01 | Stop reason: HOSPADM

## 2024-04-01 RX ORDER — SODIUM CHLORIDE 0.9 % (FLUSH) 0.9 %
10 SYRINGE (ML) INJECTION
Status: DISCONTINUED | OUTPATIENT
Start: 2024-04-01 | End: 2024-04-01 | Stop reason: HOSPADM

## 2024-04-01 RX ORDER — MIDAZOLAM HYDROCHLORIDE 2 MG/2ML
2 INJECTION, SOLUTION INTRAMUSCULAR; INTRAVENOUS ONCE AS NEEDED
Status: COMPLETED | OUTPATIENT
Start: 2024-04-01 | End: 2024-04-01

## 2024-04-01 RX ORDER — LIDOCAINE HYDROCHLORIDE 20 MG/ML
INJECTION INTRAVENOUS
Status: DISCONTINUED | OUTPATIENT
Start: 2024-04-01 | End: 2024-04-01

## 2024-04-01 RX ORDER — ONDANSETRON HYDROCHLORIDE 2 MG/ML
INJECTION, SOLUTION INTRAVENOUS
Status: DISCONTINUED | OUTPATIENT
Start: 2024-04-01 | End: 2024-04-01

## 2024-04-01 RX ADMIN — MIDAZOLAM HYDROCHLORIDE 2 MG: 1 INJECTION, SOLUTION INTRAMUSCULAR; INTRAVENOUS at 06:04

## 2024-04-01 RX ADMIN — PROPOFOL 160 MG: 10 INJECTION, EMULSION INTRAVENOUS at 07:04

## 2024-04-01 RX ADMIN — LIDOCAINE HYDROCHLORIDE 80 MG: 20 INJECTION INTRAVENOUS at 07:04

## 2024-04-01 RX ADMIN — Medication 30 MG: at 07:04

## 2024-04-01 RX ADMIN — OXYCODONE HYDROCHLORIDE 5 MG: 5 TABLET ORAL at 09:04

## 2024-04-01 RX ADMIN — FENTANYL CITRATE 50 MCG: 50 INJECTION INTRAMUSCULAR; INTRAVENOUS at 07:04

## 2024-04-01 RX ADMIN — HEPARIN SODIUM 5000 UNITS: 5000 INJECTION, SOLUTION INTRAVENOUS; SUBCUTANEOUS at 05:04

## 2024-04-01 RX ADMIN — KETOROLAC TROMETHAMINE 30 MG: 30 INJECTION, SOLUTION INTRAMUSCULAR at 08:04

## 2024-04-01 RX ADMIN — CEFAZOLIN 2 G: 330 INJECTION, POWDER, FOR SOLUTION INTRAMUSCULAR; INTRAVENOUS at 07:04

## 2024-04-01 RX ADMIN — Medication 20 MG: at 07:04

## 2024-04-01 RX ADMIN — DEXAMETHASONE SODIUM PHOSPHATE 8 MG: 4 INJECTION, SOLUTION INTRA-ARTICULAR; INTRALESIONAL; INTRAMUSCULAR; INTRAVENOUS; SOFT TISSUE at 07:04

## 2024-04-01 RX ADMIN — ONDANSETRON 4 MG: 2 INJECTION INTRAMUSCULAR; INTRAVENOUS at 08:04

## 2024-04-01 RX ADMIN — SODIUM CHLORIDE, POTASSIUM CHLORIDE, SODIUM LACTATE AND CALCIUM CHLORIDE: 600; 310; 30; 20 INJECTION, SOLUTION INTRAVENOUS at 06:04

## 2024-04-01 NOTE — DISCHARGE SUMMARY
[facility] - [facility]  Discharge Note  Short Stay    Procedure(s) (LRB):  EXCISIONAL BIOPSY, BREAST (Left)      OUTCOME: Patient tolerated treatment/procedure well without complication and is now ready for discharge.    DISPOSITION: Home or Self Care    FINAL DIAGNOSIS:  <principal problem not specified>    FOLLOWUP: In clinic    DISCHARGE INSTRUCTIONS:    Discharge Procedure Orders   Diet general     Ice to affected area     Lifting restrictions     Remove dressing in 24 hours   Order Comments: Leave glue and steri strips until clinic visit     Call MD for:  temperature >100.4     Call MD for:  persistent nausea and vomiting     Call MD for:  severe uncontrolled pain     Call MD for:  difficulty breathing, headache or visual disturbances     Call MD for:  redness, tenderness, or signs of infection (pain, swelling, redness, odor or green/yellow discharge around incision site)     Call MD for:  hives     Call MD for:  persistent dizziness or light-headedness        TIME SPENT ON DISCHARGE: 5 minutes

## 2024-04-01 NOTE — TRANSFER OF CARE
Anesthesia Transfer of Care Note    Patient: Leola Garibay    Procedure(s) Performed: Procedure(s) (LRB):  EXCISIONAL BIOPSY, BREAST (Left)    Patient location: PACU    Anesthesia Type: general    Transport from OR: Transported from OR on room air with adequate spontaneous ventilation    Post pain: adequate analgesia    Post assessment: no apparent anesthetic complications and tolerated procedure well    Post vital signs: stable    Level of consciousness: sedated    Nausea/Vomiting: no nausea/vomiting    Complications: none    Transfer of care protocol was followed

## 2024-04-01 NOTE — DISCHARGE INSTRUCTIONS
Instrucciones para la actividad Hielo en el área afectada Instrucciones para la dieta dieta general Otras instrucciones Llame al médico para: dificultad para respirar, dolor de steven o alteraciones visuales Llame al MD para: urticaria Llame al médico para: mareos o aturdimiento persistentes Llame al médico para: náuseas y vómitos persistentes Llame al médico si: enrojecimiento, sensibilidad o signos de infección (dolor, hinchazón, enrojecimiento, olor o secreción rohit/amarilla alrededor del sitio de la incisión) Llame al médico para: dolor intenso e incontrolado Llame al médico para: temperatura >100,4 Restricciones de levantamiento Retire el apósito en 24 horas. Deje el pegamento y las tiras esterilizadas hasta la visita a la clínica.   Keep follow up appointment on April 18, 2024 @ 1:00 PM [facility]. You will receive your pathology results from the surgeon.  · Alternate Tylenlol and Ibuprofen for pain.   The doctor has also prescribed a narcotic called oxycodone for more severe pain.   You may take  just Tylenol and Ibuprofen when you dont need  he narcotic or after you finish your pain prescription.                                                                                                                        · Rest for a few days, then you may return to normal activity. You may move your arm around freely.  · You may use ice pack as needed for 20 minutes at a time 6-8 times per day.  · Leave adhesive glue and bandage strips on your skin until they fall off on their own.  · You may take a shower tomorrow. Wash gently at incision sites- do not scrub or peel skin glue or bandage strips. Do not apply any ointments or creams/lotions.  Keep bra on to support surgical area sll the time, except when taking a shower.  Do not soak your wounds in water for 2 weeks. Do not take baths, swim, or use a hot tub until your doctor says it is okay.  · Do not drink alcohol or drive today, or as long as you are on  pain medication.  · Notify MD of any moderate to severe pain unrelieved by pain medicine or for any signs of infection including fever above 100.4, excessive redness or swelling, yellow/green foul- smelling drainage, nausea or vomiting. Clinics number is [phone]. If it is after business hours or emergency call [phone] and state Im having post op complications and need to speak to the surgeon on call.  · Thanks for choosing [facility]! Have a smooth recovery!                                                                                                                                                                                                                                                    *Acuda a femi de seguimiento el día 18 de rochelle de 2024 a las 13:00 horas en [facility] [place]. Recibirá los resultados de olsen patología por parte del cirujano.                                                                                                                                                                                                                                              · Alterne Tylenlol e Ibuprofeno para el dolor. El médico también le recetó un narcótico llamado oxicodona para el dolor más intenso. Puede kvgn solo Tylenol e ibuprofeno cuando no necesite el narcótico o después de terminar olsen receta para el dolor.                                                                                           · Descanse unos días, luego podrá volver a olsen actividad normal. Puede  el brazo libremente.                                                                                             · Puede usar lisa bolsa de hielo según sea necesario xochitl 20 minutos a la vez, de 6 a 8 veces al día.                                                                                   · Deje pegamento adhesivo y tiras de vendaje en olsen piel hasta que se caigan solas.                                             "                                                                        · Podrás ducharte mañana. Lávese suavemente en los lugares de la incisión; no frote ni retire el pegamento para la piel ni las tiras de vendaje. No aplique ningún ungüento ni crema/loción. Mantenga puesto el sostén para sostener el área quirúrgica todo el tiempo, excepto cuando se duche. No remojes tus heridas en agua xochitl 2 semanas. No se bañe, nade ni use un jacuzzi hasta que olsen médico se lo permita.                                                                            · No romel alcohol ni conduzca hoy o mientras esté tomando analgésicos.                                                                                                                                     · Notifique al médico sobre cualquier dolor moderado a intenso que no se alivie con analgésicos o sobre cualquier signo de infección, incluida fiebre superior a 100,4, enrojecimiento o hinchazón excesivos, secreción maloliente de color amarillo/rohit, náuseas o vómitos. El número de la clínica es [phone]. Si es después del horario comercial o de emergencia, llame al [phone] y diga "Tengo complicaciones posoperatorias y necesito hablar con el cirujano de reuben".                                                                                                                                                                                                                               · ¡Kenny por elegir [facility]! ¡Que tengas lisa recuperación tranquila!      "

## 2024-04-01 NOTE — ANESTHESIA PROCEDURE NOTES
Intubation    Date/Time: 4/1/2024 7:12 AM    Performed by: Stephanie Maki CRNA  Authorized by: Stephanie Maki CRNA    Intubation:     Induction:  Intravenous    Intubated:  Postinduction    Mask Ventilation:  Not attempted    Attempts:  1    Attempted By:  CRNA    Difficult Airway Encountered?: No      Complications:  None    Airway Device:  Supraglottic airway/LMA    Airway Device Size:  4.0    Style/Cuff Inflation:  Uncuffed    Placement Verified By:  Capnometry    Complicating Factors:  None    Findings Post-Intubation:  BS equal bilateral and atraumatic/condition of teeth unchanged

## 2024-04-01 NOTE — INTERVAL H&P NOTE
H&P Update    Patient seen and examined this morning. There are no changes to the documented H&P.    Patient has held home blood thinners for appropriate amount of time: N/A    Planned procedure: EXCISIONAL BIOPSY, BREAST    Positioning: Supine    Pre-operative Heparin Ordered: Yes    Pre-operative Antibiotics Ordered: Yes    Laterality Marked: Yes    Trixie Mane MD  5:52 AM  04/01/2024

## 2024-04-01 NOTE — ANESTHESIA POSTPROCEDURE EVALUATION
Anesthesia Post Evaluation    Patient: Leola Garibay    Procedure(s) Performed: Procedure(s) (LRB):  EXCISIONAL BIOPSY, BREAST (Left)    Final Anesthesia Type: general      Patient location during evaluation: DOSC  Post-procedure vital signs: reviewed and stable  Airway patency: patent      Anesthetic complications: no      Cardiovascular status: hemodynamically stable  Respiratory status: spontaneous ventilation  Follow-up not needed.              Vitals Value Taken Time   /62 04/01/24 1016   Temp 36.8 °C (98.2 °F) 04/01/24 1020   Pulse 78 04/01/24 1021   Resp 20 04/01/24 1020   SpO2 98 % 04/01/24 1021   Vitals shown include unvalidated device data.      Event Time   Out of Recovery 09:09:00         Pain/Lou Score: Pain Rating Prior to Med Admin: 5 (4/1/2024  9:51 AM)  Lou Score: 9 (4/1/2024  9:13 AM)  Modified Lou Score: 19 (4/1/2024 10:16 AM)

## 2024-04-01 NOTE — OP NOTE
[facility] - Periop Services  General Surgery  Operative Note    SUMMARY     Date of Procedure: 4/1/2024     Procedure: Procedure(s) (LRB):  EXCISIONAL BIOPSY, BREAST (Left)       Surgeon(s) and Role:     * Chary Sutherland MD - Primary     * Vita Manzo MD - Resident - Assisting    Pre-Operative Diagnosis: left breast fibroadenoma    Post-Operative Diagnosis: Post-Op Diagnosis Codes:     * Fibroadenoma of left breast [D24.2]    Anesthesia: Monitor Anesthesia Care    Operative Findings (including complications, if any): left breast firm, pearly mobile mass    Description of Technical Procedures:     After informed consent was confirmed, the patient was brought to the operating room and placed supine. General anesthesia was induced without complication. The left chest was prepped and draped in usual sterile fashion. A timeout was performed and all were in agreement.    A left inferior periareolar inciision was made at a site of a previous incision. This incision was carried deep with electrocautery until we encountered the palpable mass. We  the mass from the surrounding breast tissue using electrocautery. This was cored out maintaining hemostasis along the way. The specimen was circumferentially removed from the surrounding tissue and passed off the field for pathology. The wound was thoroughly irrigated and suctioned. Hemostasis was confirmed. The wound was closed with interrupted deep 2-0 vicryl sutures. The incision was then closed in two layers with 3-0 monocryl deep dermal sutures and a running 4-0 monocryl subcuticular suture.    A clean dressing was applied. The patient tolerated the procedure well. All counts were correct at the end of the case. Dr. Sutherland was directly available for all portions of the procedure.    Estimated Blood Loss (EBL): * No values recorded between 4/1/2024  7:26 AM and 4/1/2024  8:27 AM *           Implants: * No implants in log *    Specimens:   Specimen (24h  ago, onward)       Start     Ordered    04/01/24 0742  Specimen to Pathology  RELEASE UPON ORDERING        References:    Click here for ordering Quick Tip   Question:  Release to patient  Answer:  Immediate    04/01/24 0742                            Condition: Good    Disposition: PACU - hemodynamically stable.

## 2024-04-03 VITALS
RESPIRATION RATE: 20 BRPM | WEIGHT: 153 LBS | TEMPERATURE: 98 F | BODY MASS INDEX: 30.04 KG/M2 | DIASTOLIC BLOOD PRESSURE: 62 MMHG | SYSTOLIC BLOOD PRESSURE: 109 MMHG | HEIGHT: 60 IN | OXYGEN SATURATION: 97 % | HEART RATE: 76 BPM

## 2024-04-18 ENCOUNTER — OFFICE VISIT (OUTPATIENT)
Dept: SURGERY | Facility: CLINIC | Age: 29
End: 2024-04-18

## 2024-04-18 VITALS
HEART RATE: 85 BPM | OXYGEN SATURATION: 98 % | BODY MASS INDEX: 29.84 KG/M2 | DIASTOLIC BLOOD PRESSURE: 77 MMHG | SYSTOLIC BLOOD PRESSURE: 111 MMHG | WEIGHT: 152 LBS | RESPIRATION RATE: 18 BRPM | HEIGHT: 60 IN

## 2024-04-18 DIAGNOSIS — D24.2 FIBROADENOMA OF LEFT BREAST: Primary | ICD-10-CM

## 2024-04-18 PROCEDURE — 99213 OFFICE O/P EST LOW 20 MIN: CPT | Mod: PBBFAC

## 2024-04-18 NOTE — PROGRESS NOTES
[facility] Follow-Up Note    Subjective  27yo female s/p L breast fibroadenoma excision 4/1/24. Reports that she is overall doing well. Does have occasional pain around the nipple that she is taking ibuprofen for. Denies any discharge or incisional complaints. No fever or erythema around incision.    Objective  Gen: NAD, AAOx3  CV: extremities wwp, regular rate  Pulm: nonlabored, no increased wob, equal chest rise b/l   Abd: s/nt/nd  Wounds:L breast periareolar incision clean, dry, intact, no surrounding erythema, mild tenderness to palpation, no induration    Imaging: n/a    Path:     Left breast , excisional biopsy:      - Fribroadenoma.     A/P:    28 y.o. female s/p L breast fibroadenoma excision 4/1/24    - Incision healing well  - Path benign  - Follow up in breast clinic betty Manzo MD  [facility], PGY3

## 2024-04-18 NOTE — PROGRESS NOTES
I have reviewed the notes, assessments, and/or procedures performed by the resident, I concur with her/his documentation of Leola Garibay.     Chary Sutherland MD

## 2024-05-08 ENCOUNTER — LAB VISIT (OUTPATIENT)
Dept: LAB | Facility: HOSPITAL | Age: 29
End: 2024-05-08
Attending: NURSE PRACTITIONER

## 2024-05-08 ENCOUNTER — OFFICE VISIT (OUTPATIENT)
Dept: INTERNAL MEDICINE | Facility: CLINIC | Age: 29
End: 2024-05-08

## 2024-05-08 VITALS
SYSTOLIC BLOOD PRESSURE: 102 MMHG | BODY MASS INDEX: 30.27 KG/M2 | RESPIRATION RATE: 16 BRPM | WEIGHT: 154.19 LBS | TEMPERATURE: 98 F | HEART RATE: 65 BPM | DIASTOLIC BLOOD PRESSURE: 68 MMHG | HEIGHT: 60 IN

## 2024-05-08 DIAGNOSIS — N92.6 MISSED PERIOD: ICD-10-CM

## 2024-05-08 DIAGNOSIS — Z00.00 WELLNESS EXAMINATION: ICD-10-CM

## 2024-05-08 DIAGNOSIS — N92.6 MISSED PERIOD: Primary | ICD-10-CM

## 2024-05-08 LAB — B-HCG SERPL QL: NEGATIVE

## 2024-05-08 PROCEDURE — 81025 URINE PREGNANCY TEST: CPT

## 2024-05-08 PROCEDURE — 99212 OFFICE O/P EST SF 10 MIN: CPT | Mod: S$PBB,,, | Performed by: NURSE PRACTITIONER

## 2024-05-08 PROCEDURE — 99213 OFFICE O/P EST LOW 20 MIN: CPT | Mod: PBBFAC | Performed by: NURSE PRACTITIONER

## 2024-05-08 NOTE — PROGRESS NOTES
Patient ID: [id number]     Chief Complaint: Follow-up and Results    HPI:     : Carolyn [id number]    Leola Garibay is a [place] Speaking 28 y.o. female with diagnosis of no significant medical history. Patient seen today for missed period. Patient last seen on 02/09/2024.   Today, patient states missed period. States LMP around March 15-20, 2024. Patient does have Nexplanon since 8/2019. Patient instructed by GYN clinic that since she is self pay, will seek care with health unit for Nexplanon removal. Patient denies abdominal pain, N/V.   Patient denies any other acute complaints.     Patient followed by Breast Surgery Clinic. Last appointment on 04/18/2024. Fribroadenoma: 28 y.o. female s/p L breast fibroadenoma excision 4/1/24. Incision healing well. Path benign. Follow up in breast clinic prn.     Patient followed by GYN Clinic. Last appointment on 03/25/2024. nexplanon since 8/2019     Review of patient's allergies indicates:  No Known Allergies    Breast Cancer Screening: deferred due to age  Cervical Cancer Screening: referred to GYN  Colorectal Cancer Screening: deferred due to age  Diabetic Eye Exam: N/A  Diabetic Foot Exam: N/A  Lung Cancer Screening: N/A  Prostate Cancer Screening: N/A  AAA Screening: N/A  Osteoporosis Screening: deferred due to age  Medicare Wellness: N/A  Immunizations:   There is no immunization history on file for this patient.    Past Surgical History:   Procedure Laterality Date    BREAST BIOPSY Right     BREAST MASS EXCISION Left     EXCISIONAL BIOPSY Left 4/1/2024    Procedure: EXCISIONAL BIOPSY, BREAST;  Surgeon: Chary Sutherland MD;  Location: [facility];  Service: General;  Laterality: Left;     family history includes Diabetes in her paternal grandfather; No Known Problems in her father and mother.    Social History     Socioeconomic History    Marital status: Single    Number of children: 2   Tobacco Use    Smoking status: Never    Smokeless tobacco: Never    Substance and Sexual Activity    Alcohol use: Yes     Alcohol/week: 2.0 standard drinks of alcohol     Types: 2 Glasses of wine per week     Comment: occasionally    Drug use: Never    Sexual activity: Yes     Partners: Male     Social Determinants of Health     Financial Resource Strain: Low Risk  (2/9/2024)    Overall Financial Resource Strain (CARDIA)     Difficulty of Paying Living Expenses: Not very hard   Food Insecurity: Food Insecurity Present (2/9/2024)    Hunger Vital Sign     Worried About Running Out of Food in the Last Year: Sometimes true     Ran Out of Food in the Last Year: Never true   Transportation Needs: No Transportation Needs (2/9/2024)    PRAPARE - Transportation     Lack of Transportation (Medical): No     Lack of Transportation (Non-Medical): No   Physical Activity: Inactive (2/9/2024)    Exercise Vital Sign     Days of Exercise per Week: 1 day     Minutes of Exercise per Session: 0 min   Stress: Stress Concern Present (2/9/2024)    [employer] - Occupational Stress Questionnaire     Feeling of Stress : Rather much   Housing Stability: Low Risk  (2/9/2024)    Housing Stability Vital Sign     Unable to Pay for Housing in the Last Year: No     Number of Places Lived in the Last Year: 2     Unstable Housing in the Last Year: No     Current Outpatient Medications   Medication Instructions    ibuprofen (ADVIL,MOTRIN) 800 mg, Oral, 3 times daily PRN    oxyCODONE (ROXICODONE) 5 mg, Oral, Every 4 hours PRN       Subjective:     Review of Systems   Constitutional: Negative.    HENT: Negative.     Eyes: Negative.    Respiratory: Negative.     Cardiovascular: Negative.    Gastrointestinal: Negative.    Endocrine: Negative.    Genitourinary: Negative.    Musculoskeletal: Negative.    Skin: Negative.    Allergic/Immunologic: Negative.    Neurological: Negative.    Hematological: Negative.    Psychiatric/Behavioral: Negative.         Objective:     Visit Vitals  /68  (BP Location: Right arm, Patient Position: Sitting, BP Method: Medium (Automatic))   Pulse 65   Temp 98.2 °F (36.8 °C) (Oral)   Resp 16   Ht 5' (1.524 m)   Wt 69.9 kg (154 lb 3.2 oz)   LMP 03/20/2024 (Approximate)   BMI 30.12 kg/m²       Physical Exam  Vitals reviewed.   Constitutional:       Appearance: Normal appearance.   HENT:      Head: Normocephalic and atraumatic.      Mouth/Throat:      Mouth: Mucous membranes are moist.      Pharynx: Oropharynx is clear.   Eyes:      Extraocular Movements: Extraocular movements intact.      Conjunctiva/sclera: Conjunctivae normal.      Pupils: Pupils are equal, round, and reactive to light.   Cardiovascular:      Rate and Rhythm: Normal rate and regular rhythm.      Heart sounds: Normal heart sounds.   Pulmonary:      Effort: Pulmonary effort is normal.      Breath sounds: Normal breath sounds.   Abdominal:      General: Bowel sounds are normal.   Musculoskeletal:         General: Normal range of motion.      Cervical back: Normal range of motion.   Skin:     General: Skin is warm and dry.   Neurological:      Mental Status: She is alert and oriented to person, place, and time.   Psychiatric:         Mood and Affect: Mood normal.         Behavior: Behavior normal.       Labs Reviewed:     Hematology:  Lab Results   Component Value Date    WBC 6.47 10/17/2023    HGB 13.6 10/17/2023    HCT 41.3 10/17/2023     10/17/2023     Chemistry:  Lab Results   Component Value Date     10/17/2023    K 3.8 10/17/2023    CHLORIDE 108 (H) 10/17/2023    BUN 13.5 10/17/2023    CREATININE 0.61 10/17/2023    EGFRNORACEVR >60 10/17/2023    GLUCOSE 86 10/17/2023    CALCIUM 9.4 10/17/2023    ALKPHOS 114 10/17/2023    LABPROT 7.9 10/17/2023    ALBUMIN 4.2 10/17/2023    AST 20 10/17/2023    ALT 23 10/17/2023     Lab Results   Component Value Date    HGBA1C 5.0 10/17/2023     Lipid Panel:  Lab Results   Component Value Date    CHOL 166 10/17/2023    HDL 36 10/17/2023    .00  10/17/2023    TRIG 73 10/17/2023    TOTALCHOLEST 5 10/17/2023     Thyroid:  Lab Results   Component Value Date    TSH 2.629 10/17/2023     Urine:  Lab Results   Component Value Date    COLORUA Light-Yellow 10/17/2023    APPEARANCEUA Clear 10/17/2023    SGUA 1.021 10/17/2023    PHUA 5.0 10/17/2023    PROTEINUA Negative 10/17/2023    GLUCOSEUA Normal 10/17/2023    KETONESUA Negative 10/17/2023    BLOODUA 1+ (A) 10/17/2023    NITRITESUA Negative 10/17/2023    LEUKOCYTESUR Negative 10/17/2023    RBCUA 0-5 10/17/2023    WBCUA 0-5 10/17/2023    BACTERIA Trace (A) 10/17/2023    SQEPUA Moderate (A) 10/17/2023    HYALINECASTS None Seen 10/17/2023        Assessment:       ICD-10-CM ICD-9-CM   1. Missed period  N92.6 626.4       Plan:     1. Missed period  - HCG Qualitative Urine; Future      Follow up in about 6 months (around 11/8/2024) for Labs, wellness. In addition to their scheduled follow up, the patient has also been instructed to follow up on as needed basis.     LLOYD Alamo

## 2024-05-10 ENCOUNTER — TELEPHONE (OUTPATIENT)
Dept: INTERNAL MEDICINE | Facility: CLINIC | Age: 29
End: 2024-05-10

## 2024-05-10 NOTE — TELEPHONE ENCOUNTER
Called pt to inform her of her test results using  Mian Melendrez [phone]. Pt verbalized understanding with no further questions or concerns at this moment.

## 2024-08-15 ENCOUNTER — HOSPITAL ENCOUNTER (EMERGENCY)
Facility: HOSPITAL | Age: 29
Discharge: HOME | End: 2024-08-15
Attending: EMERGENCY MEDICINE

## 2024-08-15 VITALS
HEART RATE: 68 BPM | TEMPERATURE: 97 F | BODY MASS INDEX: 29.88 KG/M2 | WEIGHT: 153 LBS | OXYGEN SATURATION: 100 % | DIASTOLIC BLOOD PRESSURE: 65 MMHG | SYSTOLIC BLOOD PRESSURE: 108 MMHG | RESPIRATION RATE: 18 BRPM

## 2024-08-15 DIAGNOSIS — N61.0 CELLULITIS OF RIGHT BREAST: ICD-10-CM

## 2024-08-15 DIAGNOSIS — N64.4 BREAST PAIN, LEFT: Primary | ICD-10-CM

## 2024-08-15 LAB
B-HCG UR QL: NEGATIVE
CTP QC/QA: YES

## 2024-08-15 PROCEDURE — 99284 EMERGENCY DEPT VISIT MOD MDM: CPT | Mod: 25

## 2024-08-15 PROCEDURE — 81025 URINE PREGNANCY TEST: CPT | Performed by: NURSE PRACTITIONER

## 2024-08-15 RX ORDER — SULFAMETHOXAZOLE AND TRIMETHOPRIM 800; 160 MG/1; MG/1
1 TABLET ORAL 2 TIMES DAILY
Qty: 20 TABLET | Refills: 0 | Status: SHIPPED | OUTPATIENT
Start: 2024-08-15 | End: 2024-08-25

## 2024-08-15 RX ORDER — DICLOFENAC SODIUM 75 MG/1
75 TABLET, DELAYED RELEASE ORAL 2 TIMES DAILY PRN
Qty: 20 TABLET | Refills: 0 | Status: SHIPPED | OUTPATIENT
Start: 2024-08-15

## 2024-08-15 NOTE — ED PROVIDER NOTES
Encounter Date: 8/15/2024       History     Chief Complaint   Patient presents with    Breast Pain     PT REPORTS LT BREAST PAIN AND TENDERNESS X 5 DAYS. DENIES DC FROM [facility].  HX OF BREAST SURG. FOR FIBROADENOMA 4/1/24.      The patient presents with left breast pain.  The onset was 5 days ago.  The course/duration of symptoms is intermittent and fluctuating.  She reports the pain is mostly at night. Location: left breast below nipple. The character of symptoms is mild pain.  The degree of symptoms is minimal.  Risk factors consist of history of s/p L breast fibroadenoma excision 4/1/24.  Prior episodes: occasional.  Therapy today: none.  Associated symptoms: denies skin changes, denies nipple changes and discharge, denies fever, and denies chills. Video  used for encounter.        Review of patient's allergies indicates:  No Known Allergies  Past Medical History:   Diagnosis Date    Breast disorder      Past Surgical History:   Procedure Laterality Date    BREAST BIOPSY Right     BREAST MASS EXCISION Left     EXCISIONAL BIOPSY Left 4/1/2024    Procedure: EXCISIONAL BIOPSY, BREAST;  Surgeon: Chary Sutherland MD;  Location: [facility];  Service: General;  Laterality: Left;     Family History   Problem Relation Name Age of Onset    No Known Problems Mother      No Known Problems Father      Diabetes Paternal Grandfather       Social History     Tobacco Use    Smoking status: Never    Smokeless tobacco: Never   Substance Use Topics    Alcohol use: Yes     Alcohol/week: 2.0 standard drinks of alcohol     Types: 2 Glasses of wine per week     Comment: occasionally    Drug use: Never     Review of Systems   Constitutional:  Negative for fever.   HENT:  Negative for sore throat.    Respiratory:  Negative for shortness of breath.    Cardiovascular:  Negative for chest pain.   Gastrointestinal:  Negative for nausea.   Genitourinary:  Negative for dysuria.   Musculoskeletal:  Negative for back pain.   Skin:  Negative  for rash.   Neurological:  Negative for weakness.   Hematological:  Does not bruise/bleed easily.   All other systems reviewed and are negative.      Physical Exam     Initial Vitals [08/15/24 0757]   BP Pulse Resp Temp SpO2   105/66 67 16 97.2 °F (36.2 °C) 100 %      MAP       --         Physical Exam    Nursing note and vitals reviewed.  Constitutional: She appears well-developed and well-nourished.   HENT:   Head: Normocephalic and atraumatic.   Neck: Neck supple.   Normal range of motion.  Cardiovascular:  Normal rate, regular rhythm, normal heart sounds and intact distal pulses.           Pulmonary/Chest: Effort normal and breath sounds normal.   Breast exam: approximately 2cm firm mildly tender mass at 5 o'clock adjacent to nipple left breast, no erythema or fluctuance, no skin changes, no nipple changes or discharge   Abdominal: Abdomen is soft and flat. Bowel sounds are normal. There is no abdominal tenderness.   Musculoskeletal:         General: Normal range of motion.      Cervical back: Normal range of motion and neck supple.     Neurological: She is alert. She has normal strength.   Skin: Skin is warm and dry.   Psychiatric: She has a normal mood and affect.         ED Course   Procedures  Labs Reviewed   POCT URINE PREGNANCY       Result Value    POC Preg Test, Ur Negative       Acceptable Yes            Imaging Results              US Soft Tissue Chest_Upper Back (Final result)  Result time 08/15/24 11:40:53      Final result by Pawan Sr MD (08/15/24 11:40:53)                   Narrative:    EXAMINATION  US SOFT TISSUE CHEST_UPPER BACK    CLINICAL HISTORY  r/o abscess left breast;    TECHNIQUE  Focused multiplanar sonographic images of the ventral thoracic wall soft tissues were obtained.    COMPARISON  None available at the time of initial interpretation.    FINDINGS  Exam quality: adequate for evaluation    At the indicated area of concern there is an irregular region of  localized fluid measuring 2 cm x 2.3 cm x 0.7 cm.  No definite walled off drainable collection is identified at this time.  Doppler interrogation demonstrates no abnormal vascular flow or evidence of surrounding hyperemia.    IMPRESSION  Small disorganized fluid at the area of concern.  No convincing drainable collection.      Electronically signed by: Pawan Sr  Date:    08/15/2024  Time:    11:40                                     Medications - No data to display  Medical Decision Making  The patient presents with left breast pain.  The onset was 5 days ago.  The course/duration of symptoms is intermittent and fluctuating.  She reports the pain is mostly at night. Location: left breast below nipple. The character of symptoms is mild pain.  The degree of symptoms is minimal.  Risk factors consist of history of s/p L breast fibroadenoma excision 4/1/24.  Prior episodes: occasional.  Therapy today: none.  Associated symptoms: denies skin changes, denies nipple changes and discharge, denies fever, and denies chills. Video  used for encounter.      Per u/s: Small disorganized fluid at the area of concern.  No convincing drainable collection. Will cover with abx and refer to breast clinic. Strict return to ER precautions given.    Amount and/or Complexity of Data Reviewed  Labs: ordered.  Radiology: ordered. Decision-making details documented in ED Course.      Additional MDM:   Differential Diagnosis:   At this time differential diagnosis is but not limited to breast mass, abscess, soft tissue infection, cellulitis                ED Course as of 08/15/24 1204   Thu Aug 15, 2024   1148 US Soft Tissue Chest_Upper Back  IMPRESSION  Small disorganized fluid at the area of concern.  No convincing drainable collection.   [RB]   1156 Given strict ED return precautions. I have spoken with the patient and/or caregivers. I have explained the patient's condition, diagnoses and treatment plan based on the information  available to me at this time. I have answered the patient's and/or caregiver's questions and addressed any concerns. The patient and/or caregivers have as good an understanding of the patient's diagnosis, condition and treatment plan as can be expected at this point. The vital signs have been stable. The patient's condition is stable and appropriate for discharge from the emergency department.      The patient will pursue further outpatient evaluation with the primary care physician or other designated or consulting physician as outlined in the discharge instructions. The patient and/or caregivers are agreeable to this plan of care and follow-up instructions have been explained in detail. The patient and/or caregivers have received these instructions in written format and have expressed an understanding of the discharge instructions. The patient and/or caregivers are aware that any significant change in condition or worsening of symptoms should prompt an immediate return to this or the closest emergency department or a call to 911.      [RB]      ED Course User Index  [RB] Misael Sandy ACNP                             Clinical Impression:  Final diagnoses:  [N64.4] Breast pain, left (Primary)  [N61.0] Cellulitis of right breast          ED Disposition Condition    Discharge Stable          ED Prescriptions       Medication Sig Dispense Start Date End Date Auth. Provider    sulfamethoxazole-trimethoprim 800-160mg (BACTRIM DS) 800-160 mg Tab Take 1 tablet by mouth 2 (two) times daily. for 10 days 20 tablet 8/15/2024 8/25/2024 Misael Sandy ACNP    diclofenac (VOLTAREN) 75 MG EC tablet Take 1 tablet (75 mg total) by mouth 2 (two) times daily as needed (pain). 20 tablet 8/15/2024 -- Misael Sandy ACNP          Follow-up Information       Follow up With Specialties Details Why Contact Info    Chary Moreno, FNP Family Medicine In 3 days  [address] [facility] [id number] [phone]      [facility] -  Emergency Dept Emergency Medicine  If symptoms worsen [address] [facility] [id number] [phone]    urgent referral sent to breast clinic                 Misael Sandy, ANAMARIA  08/15/24 7653

## 2024-09-19 ENCOUNTER — HOSPITAL ENCOUNTER (EMERGENCY)
Facility: HOSPITAL | Age: 29
Discharge: HOME | End: 2024-09-19
Attending: INTERNAL MEDICINE

## 2024-09-19 VITALS
HEART RATE: 75 BPM | SYSTOLIC BLOOD PRESSURE: 124 MMHG | TEMPERATURE: 99 F | RESPIRATION RATE: 18 BRPM | HEIGHT: 65 IN | DIASTOLIC BLOOD PRESSURE: 79 MMHG | OXYGEN SATURATION: 100 % | BODY MASS INDEX: 25.26 KG/M2 | WEIGHT: 151.63 LBS

## 2024-09-19 DIAGNOSIS — N30.00 ACUTE CYSTITIS WITHOUT HEMATURIA: Primary | ICD-10-CM

## 2024-09-19 LAB
ALBUMIN SERPL-MCNC: 4.2 G/DL (ref 3.5–5)
ALBUMIN/GLOB SERPL: 1.1 RATIO (ref 1.1–2)
ALP SERPL-CCNC: 128 UNIT/L (ref 40–150)
ALT SERPL-CCNC: 29 UNIT/L (ref 0–55)
ANION GAP SERPL CALC-SCNC: 9 MEQ/L
AST SERPL-CCNC: 18 UNIT/L (ref 5–34)
B-HCG UR QL: NEGATIVE
BACTERIA #/AREA URNS AUTO: ABNORMAL /HPF
BASOPHILS # BLD AUTO: 0.03 X10(3)/MCL
BASOPHILS NFR BLD AUTO: 0.4 %
BILIRUB SERPL-MCNC: 0.5 MG/DL
BILIRUB UR QL STRIP.AUTO: NEGATIVE
BUN SERPL-MCNC: 9.2 MG/DL (ref 7–18.7)
CALCIUM SERPL-MCNC: 9.3 MG/DL (ref 8.4–10.2)
CHLORIDE SERPL-SCNC: 107 MMOL/L (ref 98–107)
CLARITY UR: CLEAR
CO2 SERPL-SCNC: 24 MMOL/L (ref 22–29)
COLOR UR AUTO: COLORLESS
CREAT SERPL-MCNC: 0.72 MG/DL (ref 0.55–1.02)
CREAT/UREA NIT SERPL: 13
CTP QC/QA: YES
EOSINOPHIL # BLD AUTO: 0.12 X10(3)/MCL (ref 0–0.9)
EOSINOPHIL NFR BLD AUTO: 1.4 %
ERYTHROCYTE [DISTWIDTH] IN BLOOD BY AUTOMATED COUNT: 12.9 % (ref 11.5–17)
GFR SERPLBLD CREATININE-BSD FMLA CKD-EPI: >60 ML/MIN/1.73/M2
GLOBULIN SER-MCNC: 3.7 GM/DL (ref 2.4–3.5)
GLUCOSE SERPL-MCNC: 95 MG/DL (ref 74–100)
GLUCOSE UR QL STRIP: NORMAL
HCT VFR BLD AUTO: 40.4 % (ref 37–47)
HGB BLD-MCNC: 13.9 G/DL (ref 12–16)
HGB UR QL STRIP: ABNORMAL
HOLD SPECIMEN: NORMAL
HYALINE CASTS #/AREA URNS LPF: ABNORMAL /LPF
IMM GRANULOCYTES # BLD AUTO: 0.04 X10(3)/MCL (ref 0–0.04)
IMM GRANULOCYTES NFR BLD AUTO: 0.5 %
KETONES UR QL STRIP: NEGATIVE
LEUKOCYTE ESTERASE UR QL STRIP: 250
LIPASE SERPL-CCNC: 30 U/L
LYMPHOCYTES # BLD AUTO: 1.9 X10(3)/MCL (ref 0.6–4.6)
LYMPHOCYTES NFR BLD AUTO: 22.9 %
MCH RBC QN AUTO: 30.7 PG (ref 27–31)
MCHC RBC AUTO-ENTMCNC: 34.4 G/DL (ref 33–36)
MCV RBC AUTO: 89.2 FL (ref 80–94)
MONOCYTES # BLD AUTO: 0.57 X10(3)/MCL (ref 0.1–1.3)
MONOCYTES NFR BLD AUTO: 6.9 %
NEUTROPHILS # BLD AUTO: 5.65 X10(3)/MCL (ref 2.1–9.2)
NEUTROPHILS NFR BLD AUTO: 67.9 %
NITRITE UR QL STRIP: NEGATIVE
NRBC BLD AUTO-RTO: 0 %
PH UR STRIP: 6 [PH]
PLATELET # BLD AUTO: 223 X10(3)/MCL (ref 130–400)
PMV BLD AUTO: 11 FL (ref 7.4–10.4)
POTASSIUM SERPL-SCNC: 3.3 MMOL/L (ref 3.5–5.1)
PROT SERPL-MCNC: 7.9 GM/DL (ref 6.4–8.3)
PROT UR QL STRIP: NEGATIVE
RBC # BLD AUTO: 4.53 X10(6)/MCL (ref 4.2–5.4)
RBC #/AREA URNS AUTO: ABNORMAL /HPF
SODIUM SERPL-SCNC: 140 MMOL/L (ref 136–145)
SP GR UR STRIP.AUTO: 1 (ref 1–1.03)
SQUAMOUS #/AREA URNS LPF: ABNORMAL /HPF
UROBILINOGEN UR STRIP-ACNC: NORMAL
WBC # BLD AUTO: 8.31 X10(3)/MCL (ref 4.5–11.5)
WBC #/AREA URNS AUTO: ABNORMAL /HPF

## 2024-09-19 PROCEDURE — 81001 URINALYSIS AUTO W/SCOPE: CPT | Performed by: PHYSICIAN ASSISTANT

## 2024-09-19 PROCEDURE — 85025 COMPLETE CBC W/AUTO DIFF WBC: CPT | Performed by: PHYSICIAN ASSISTANT

## 2024-09-19 PROCEDURE — 87086 URINE CULTURE/COLONY COUNT: CPT | Performed by: PHYSICIAN ASSISTANT

## 2024-09-19 PROCEDURE — 96372 THER/PROPH/DIAG INJ SC/IM: CPT | Performed by: PHYSICIAN ASSISTANT

## 2024-09-19 PROCEDURE — 63600175 PHARM REV CODE 636 W HCPCS: Performed by: PHYSICIAN ASSISTANT

## 2024-09-19 PROCEDURE — 81025 URINE PREGNANCY TEST: CPT | Performed by: PHYSICIAN ASSISTANT

## 2024-09-19 PROCEDURE — 83690 ASSAY OF LIPASE: CPT | Performed by: PHYSICIAN ASSISTANT

## 2024-09-19 PROCEDURE — 99285 EMERGENCY DEPT VISIT HI MDM: CPT | Mod: 25

## 2024-09-19 PROCEDURE — 87077 CULTURE AEROBIC IDENTIFY: CPT | Performed by: PHYSICIAN ASSISTANT

## 2024-09-19 PROCEDURE — 80053 COMPREHEN METABOLIC PANEL: CPT | Performed by: PHYSICIAN ASSISTANT

## 2024-09-19 PROCEDURE — 87186 SC STD MICRODIL/AGAR DIL: CPT | Performed by: PHYSICIAN ASSISTANT

## 2024-09-19 RX ORDER — KETOROLAC TROMETHAMINE 30 MG/ML
15 INJECTION, SOLUTION INTRAMUSCULAR; INTRAVENOUS
Status: COMPLETED | OUTPATIENT
Start: 2024-09-19 | End: 2024-09-19

## 2024-09-19 RX ORDER — NITROFURANTOIN 25; 75 MG/1; MG/1
100 CAPSULE ORAL 2 TIMES DAILY
Qty: 10 CAPSULE | Refills: 0 | Status: SHIPPED | OUTPATIENT
Start: 2024-09-19 | End: 2024-09-24

## 2024-09-19 RX ADMIN — KETOROLAC TROMETHAMINE 15 MG: 30 INJECTION, SOLUTION INTRAMUSCULAR; INTRAVENOUS at 06:09

## 2024-09-19 NOTE — ED PROVIDER NOTES
Encounter Date: 9/19/2024       History     Chief Complaint   Patient presents with    Abdominal Pain     Left lower abd pain with nausea that started yesterday afternoon. Denies vomiting, diarrhea, or urinary complaints.      Leola Garibay is a 29 y.o. female with no known medical problems who presents to the ED complaining of LUQ abdominal pain and left flank pain. Reports pain started yesterday after riding in a car for 9 hours. Occasional nausea. Denies fevers, chills, constipation, diarrhea, dysuria, vaginal discharge. Last bowel movement was today and was normal.     The history is provided by the patient. The history is limited by a language barrier. A  was used.     Review of patient's allergies indicates:  No Known Allergies  Past Medical History:   Diagnosis Date    Breast disorder      Past Surgical History:   Procedure Laterality Date    BREAST BIOPSY Right     BREAST MASS EXCISION Left     EXCISIONAL BIOPSY Left 4/1/2024    Procedure: EXCISIONAL BIOPSY, BREAST;  Surgeon: Chary Sutherland MD;  Location: [facility];  Service: General;  Laterality: Left;     Family History   Problem Relation Name Age of Onset    No Known Problems Mother      No Known Problems Father      Diabetes Paternal Grandfather       Social History     Tobacco Use    Smoking status: Never    Smokeless tobacco: Never   Substance Use Topics    Alcohol use: Yes     Alcohol/week: 2.0 standard drinks of alcohol     Types: 2 Glasses of wine per week     Comment: occasionally    Drug use: Never     Review of Systems   Constitutional:  Negative for fever.   HENT:  Negative for sore throat.    Respiratory:  Negative for shortness of breath.    Cardiovascular:  Negative for chest pain.   Gastrointestinal:  Positive for abdominal pain and nausea.   Genitourinary:  Positive for flank pain. Negative for dysuria.   Musculoskeletal:  Negative for back pain.   Skin:  Negative for rash.   Neurological:  Negative for  weakness.   Hematological:  Does not bruise/bleed easily.       Physical Exam     Initial Vitals [09/19/24 1736]   BP Pulse Resp Temp SpO2   124/79 70 18 98.9 °F (37.2 °C) 100 %      MAP       --         Physical Exam    Nursing note and vitals reviewed.  Constitutional: She appears well-developed and well-nourished. No distress.   HENT:   Head: Normocephalic and atraumatic.   Mouth/Throat: No oropharyngeal exudate.   Eyes: EOM are normal. No scleral icterus.   Neck: Neck supple.   Normal range of motion.  Cardiovascular:  Normal rate and regular rhythm.           No murmur heard.  Pulmonary/Chest: Breath sounds normal. No respiratory distress. She has no wheezes.   Abdominal: Abdomen is soft. Bowel sounds are normal. She exhibits no distension. There is abdominal tenderness in the left upper quadrant.   No right CVA tenderness.  There is left CVA tenderness. There is no rebound and no guarding.   Musculoskeletal:         General: No tenderness. Normal range of motion.      Cervical back: Normal range of motion and neck supple.     Neurological: She is alert and oriented to person, place, and time. No cranial nerve deficit.   Skin: Skin is warm and dry. Capillary refill takes less than 2 seconds. No erythema.   Psychiatric: She has a normal mood and affect. Her behavior is normal. Judgment and thought content normal.         ED Course   Procedures  Labs Reviewed   URINALYSIS, REFLEX TO URINE CULTURE - Abnormal       Result Value    Color, UA Colorless (*)     Appearance, UA Clear      Specific Gravity, UA 1.005      pH, UA 6.0      Protein, UA Negative      Glucose, UA Normal      Ketones, UA Negative      Blood, UA 1+ (*)     Bilirubin, UA Negative      Urobilinogen, UA Normal      Nitrites, UA Negative      Leukocyte Esterase,  (*)     RBC, UA 0-5      WBC, UA 21-50 (*)     Bacteria, UA Occ (*)     Squamous Epithelial Cells, UA Few (*)     Hyaline Casts, UA None Seen     COMPREHENSIVE METABOLIC PANEL -  Abnormal    Sodium 140      Potassium 3.3 (*)     Chloride 107      CO2 24      Glucose 95      Blood Urea Nitrogen 9.2      Creatinine 0.72      Calcium 9.3      Protein Total 7.9      Albumin 4.2      Globulin 3.7 (*)     Albumin/Globulin Ratio 1.1      Bilirubin Total 0.5            ALT 29      AST 18      eGFR >60      Anion Gap 9.0      BUN/Creatinine Ratio 13     CBC WITH DIFFERENTIAL - Abnormal    WBC 8.31      RBC 4.53      Hgb 13.9      Hct 40.4      MCV 89.2      MCH 30.7      MCHC 34.4      RDW 12.9      Platelet 223      MPV 11.0 (*)     Neut % 67.9      Lymph % 22.9      Mono % 6.9      Eos % 1.4      Basophil % 0.4      Lymph # 1.90      Neut # 5.65      Mono # 0.57      Eos # 0.12      Baso # 0.03      IG# 0.04      IG% 0.5      NRBC% 0.0     LIPASE - Normal    Lipase Level 30     CULTURE, URINE   CBC W/ AUTO DIFFERENTIAL    Narrative:     The following orders were created for panel order CBC auto differential.  Procedure                               Abnormality         Status                     ---------                               -----------         ------                     CBC with Differential[[id number]]       Abnormal            Final result                 Please view results for these tests on the individual orders.   EXTRA TUBES    Narrative:     The following orders were created for panel order EXTRA TUBES.  Procedure                               Abnormality         Status                     ---------                               -----------         ------                     Light Blue Top Hold[[id number]]                             Final result               Lavender Top Hold[[id number]]                               Final result               Gold Top Hold[[id number]]                                   Final result                 Please view results for these tests on the individual orders.   LIGHT BLUE TOP HOLD    Extra Tube Hold for add-ons.     LAVENDER TOP HOLD     Extra Tube Hold for add-ons.     GOLD TOP HOLD    Extra Tube Hold for add-ons.     POCT URINE PREGNANCY    POC Preg Test, Ur Negative       Acceptable Yes            Imaging Results              CT Renal Stone Study ABD Pelvis WO (Final result)  Result time 09/19/24 19:56:49      Final result by Carlos Morgan MD (09/19/24 19:56:49)                   Impression:      No abdominopelvic visceral acute findings identified.      Electronically signed by: Carlos Morgan  Date:    09/19/2024  Time:    19:56               Narrative:    EXAMINATION:  CT RENAL STONE STUDY ABD PELVIS WO    CLINICAL HISTORY:  Flank pain, kidney stone suspected;    TECHNIQUE:  Multidetector axial images were obtained from the  diaphragms to below symphysis pubis without the administration of IV contrast. Oral contrast was not administered.    Dose length product of 316 mGycm. Automated exposure control was utilized to minimize radiation dose.    COMPARISON:  None available    FINDINGS:  Included lungs are without suspicious nodularity, acute air space infiltrates or fluid within the pleural spaces.    Within limitations of noncontrast technique, no acute findings of the liver, pancreas and spleen identified. Gallbladder wall is not thickened and there is no intraluminal calcified calculus. No apparent biliary dilation.    The adrenal glands noncontrast evaluation is unremarkable. The kidneys are unremarkable in size and contour. There is no hydronephrosis or nephrolithiasis. The ureters appear normal in course and diameter without intra ureteral stone.    Stomach is decompressed.  There is no abnormal dilatation of loops of small bowel.  Portion of the appendix is seen on image 42 series 6 and is nondilated.  Colon is nondistended and there is no transition or bowel obstruction.  No inflammatory changes.  No free fluid.  Umbilical minimal fat containing hernia.    Urinary bladder appears within normal limits. No intravesical  stone identified.  Endometrium is not delineated.  There is no pelvic free fluid.                                       Medications   ketorolac injection 15 mg (15 mg Intramuscular Given 9/19/24 1834)     Medical Decision Making  Differential: pyelonephritis, nephrolithiasis, among others    ED management: HDS and afebrile. Pt resting comfortably in no acute distress. Mild TTP in LUQ and + L CVA tenderness. No rebound or guarding. LFTs, lipase WNL. UA infectious. CT without stone or perinephric stranding. Pain improved with toradol. Stable for discharge home with macrobid x 5 days. Instructed to take ibuprofen prn pain. Follow up with PCP in 3 days. ED return precautions given. She verbalized understanding. All questions answered.     Amount and/or Complexity of Data Reviewed  Labs: ordered. Decision-making details documented in ED Course.  Radiology: ordered. Decision-making details documented in ED Course.    Risk  Prescription drug management.               ED Course as of 09/19/24 2057   Thu Sep 19, 2024   1922 Leukocyte Esterase, UA(!): 250 [KD]   1923 WBC, UA(!): 21-50 [KD]   2019 CT Renal Stone Study ABD Pelvis WO  No abdominopelvic visceral acute findings identified. [KD]      ED Course User Index  [KD] Renee Luis PA-C                           Clinical Impression:  Final diagnoses:  [N30.00] Acute cystitis without hematuria (Primary)          ED Disposition Condition    Discharge Stable          ED Prescriptions       Medication Sig Dispense Start Date End Date Auth. Provider    nitrofurantoin, macrocrystal-monohydrate, (MACROBID) 100 MG capsule Take 1 capsule (100 mg total) by mouth 2 (two) times daily. for 5 days 10 capsule 9/19/2024 9/24/2024 Renee Luis PA-C          Follow-up Information       Follow up With Specialties Details Why Contact Info    [facility] Emergency Medicine  If symptoms worsen [address]  [address]  [phone]    Chary Moreno, FNP Family Medicine In 3 days Hospital follow up [address] [facility] [address]  [phone]               Renee Luis PA-C  09/19/24 2100

## 2024-09-21 LAB — BACTERIA UR CULT: ABNORMAL

## 2025-04-16 ENCOUNTER — OFFICE VISIT (OUTPATIENT)
Dept: GYNECOLOGY | Facility: CLINIC | Age: 30
End: 2025-04-16

## 2025-04-16 VITALS
HEART RATE: 70 BPM | WEIGHT: 155.81 LBS | BODY MASS INDEX: 25.96 KG/M2 | SYSTOLIC BLOOD PRESSURE: 114 MMHG | HEIGHT: 65 IN | RESPIRATION RATE: 20 BRPM | TEMPERATURE: 99 F | OXYGEN SATURATION: 100 % | DIASTOLIC BLOOD PRESSURE: 72 MMHG

## 2025-04-16 DIAGNOSIS — Z01.419 ENCOUNTER FOR ANNUAL ROUTINE GYNECOLOGICAL EXAMINATION: Primary | ICD-10-CM

## 2025-04-16 DIAGNOSIS — Z11.3 ROUTINE SCREENING FOR STI (SEXUALLY TRANSMITTED INFECTION): ICD-10-CM

## 2025-04-16 DIAGNOSIS — Z30.40 SURVEILLANCE OF CONTRACEPTIVE IMPLANT: ICD-10-CM

## 2025-04-16 DIAGNOSIS — Z23 NEED FOR HPV VACCINE: ICD-10-CM

## 2025-04-16 LAB
C TRACH DNA SPEC QL NAA+PROBE: NOT DETECTED
CLUE CELLS VAG QL WET PREP: ABNORMAL
N GONORRHOEA DNA SPEC QL NAA+PROBE: NOT DETECTED
SOURCE (OHS): NORMAL
T VAGINALIS VAG QL WET PREP: ABNORMAL
WBC #/AREA VAG WET PREP: ABNORMAL
YEAST SPEC QL WET PREP: ABNORMAL

## 2025-04-16 PROCEDURE — 90471 IMMUNIZATION ADMIN: CPT | Mod: PBBFAC

## 2025-04-16 PROCEDURE — 99214 OFFICE O/P EST MOD 30 MIN: CPT | Mod: PBBFAC | Performed by: NURSE PRACTITIONER

## 2025-04-16 PROCEDURE — 87210 SMEAR WET MOUNT SALINE/INK: CPT | Performed by: NURSE PRACTITIONER

## 2025-04-16 PROCEDURE — 87491 CHLMYD TRACH DNA AMP PROBE: CPT | Performed by: NURSE PRACTITIONER

## 2025-04-16 PROCEDURE — 90651 9VHPV VACCINE 2/3 DOSE IM: CPT | Mod: PBBFAC

## 2025-04-16 RX ADMIN — HUMAN PAPILLOMAVIRUS 9-VALENT VACCINE, RECOMBINANT 0.5 ML: 30; 40; 60; 40; 20; 20; 20; 20; 20 INJECTION, SUSPENSION INTRAMUSCULAR at 08:04

## 2025-04-16 NOTE — PROGRESS NOTES
Gave patient Gradasil #1 in her right Dorsalgluteal. Patient tolerated well and left with no concerns.

## 2025-04-16 NOTE — PROGRESS NOTES
"  [facility] -  Gynecology / Women's Health Clinic      Subjective:       Patient ID: Leola Garibay is a 29 y.o. female.    Chief Complaint:  Gynecologic Exam    History of Present Illness  The patient  here for annual exam. Pt currently amenorrheic. She [facility] . Denies history of abnormal pap. Pap in -NIL and neg. Denies urinary complaints. Denies pelvic pain, abnormal bleeding or discharge. Pt has hx of LT breast bx with excision of fibroadenoma with Breast surgery in . Pt reports no STIs in the past and desires testing. Denies tobacco use. Dep. Screening 0. Denies fly hx of breast, ovarian, uterine or colon cancer. Desires HPV vaccine.    Use of : [place] 260584    GYN & OB History  No LMP recorded. Patient has had an implant.   Date of Last Pap: 3/28/2024    Review of patient's allergies indicates:  No Known Allergies  Past Medical History:   Diagnosis Date    Breast disorder      OB History    Para Term  AB Living   2 2       SAB IAB Ectopic Multiple Live Births             # Outcome Date GA Lbr Avery/2nd Weight Sex Type Anes PTL Lv   2 Para            1 Para                 Review of Systems  Review of Systems    Negative except for pertinent findings for positives per HPI     Objective:    Physical Exam    /72 (BP Location: Right arm, Patient Position: Sitting)   Pulse 70   Temp 98.6 °F (37 °C) (Oral)   Resp 20   Ht 5' 5" (1.651 m)   Wt 70.7 kg (155 lb 12.8 oz)   SpO2 100%   BMI 25.93 kg/m²   GENERAL: Well-developed female. No acute distress.    SKIN: Normal to inspection, warm and intact. Nexplanon palpated to LT UE  BREASTS: No rashes or erythema. No masses, lumps, discharge, tenderness.  VULVA: General appearance normal; external genitalia with no lesions or erythema.  VAGINA: Mucosa/vaginal vault pink, no abnormal discharge or lesions.  CERVIX: Pink, nulliparous appearing os, no erythema or " abnormal discharge.  BIMANUAL EXAM: reveals a 10 week-sized uterus. The uterus is non tender. Marlon adnexa reveal no tenderness.  PSYCHIATRIC: Patient is oriented to person, place, and time. Mood and affect are normal.    Assessment:         ICD-10-CM ICD-9-CM   1. Encounter for annual routine gynecological examination  Z01.419 V72.31   2. Routine screening for STI (sexually transmitted infection)  Z11.3 V74.5   3. Surveillance of contraceptive implant  Z30.40 V25.43   4. Need for HPV vaccine  Z23 V04.89     Plan:   Leola was seen today for gynecologic exam.    Diagnoses and all orders for this visit:    Encounter for annual routine gynecological examination    Routine screening for STI (sexually transmitted infection)  -     Chlamydia/GC, PCR  -     Wet Prep, Genital    Surveillance of contraceptive implant    Need for HPV vaccine  -     hpv vaccine,9-taylor (GARDASIL 9) vaccine 0.5 mL    Pelvic today, pap utd per ACOG  Wet prep and g/c  Nexplanon palpated to Lt UE  Benefits and risk associated with HPV and the Gardasil vaccine discussed with pt. HPV vaccine today, #2 in 2 months and #3 in 6 months after 1st injection. Side effects such as pain, swelling, redness/itching, fever, nausea and dizziness can occur. Recommend waiting 15 min in waiting room following injection.  Follow up in about 1 year (around 4/16/2026) for annual exam.

## 2025-06-26 ENCOUNTER — CLINICAL SUPPORT (OUTPATIENT)
Dept: GYNECOLOGY | Facility: CLINIC | Age: 30
End: 2025-06-26

## 2025-06-26 DIAGNOSIS — Z23 NEED FOR HPV VACCINE: Primary | ICD-10-CM

## 2025-06-26 PROCEDURE — 90651 9VHPV VACCINE 2/3 DOSE IM: CPT | Mod: PBBFAC

## 2025-06-26 PROCEDURE — 99211 OFF/OP EST MAY X REQ PHY/QHP: CPT | Mod: PBBFAC

## 2025-06-26 PROCEDURE — 90471 IMMUNIZATION ADMIN: CPT | Mod: PBBFAC

## 2025-06-26 RX ADMIN — HUMAN PAPILLOMAVIRUS 9-VALENT VACCINE, RECOMBINANT 0.5 ML: 30; 40; 60; 40; 20; 20; 20; 20; 20 INJECTION, SUSPENSION INTRAMUSCULAR at 03:06

## 2025-06-26 NOTE — PROGRESS NOTES
Administered #2 Gardasil-9 (0.5 ml) IM left deltoid.  Monitored pt in clinic after injection for 20 minutes then discharged from clinic with NO s/s of any adverse reactions.

## (undated) DEVICE — SPONGE GAUZE 16PLY 4X4

## (undated) DEVICE — SUT SA85H SILK 2-0

## (undated) DEVICE — Device

## (undated) DEVICE — NDL HYPO REG 25G X 1 1/2

## (undated) DEVICE — SOL 9P NACL IRR PIC IL

## (undated) DEVICE — ADHESIVE DERMABOND ADVANCED

## (undated) DEVICE — YANKAUER FLEX NO VENT REG CAP

## (undated) DEVICE — SYR 10CC LUER LOCK

## (undated) DEVICE — GLOVE SENSICARE PI GRN 6

## (undated) DEVICE — SUT 2-0 VICRYL / SH (J417)

## (undated) DEVICE — GLOVE SENSICARE PI GRN 7.5

## (undated) DEVICE — GOWN POLY REINF BRTH SLV XL

## (undated) DEVICE — STRIP MEDI WND CLSR 1/2X4IN

## (undated) DEVICE — GLOVE SIGNATURE ESSNTL LTX 7

## (undated) DEVICE — KIT SURGICAL TURNOVER

## (undated) DEVICE — SUT 3-0 MONOCRYL PLUS PS-2

## (undated) DEVICE — GLOVE SENSICARE PI GRN 6.5

## (undated) DEVICE — MANIFOLD 4 PORT

## (undated) DEVICE — GLOVE SIGNATURE MICRO LTX 6.5

## (undated) DEVICE — SPONGE LAP 18X18 PREWASHED

## (undated) DEVICE — GLOVE SIGNATURE MICRO LTX 7.5

## (undated) DEVICE — GLOVE SENSICARE PI GRN 7